# Patient Record
Sex: FEMALE | Race: WHITE | NOT HISPANIC OR LATINO | Employment: PART TIME | URBAN - METROPOLITAN AREA
[De-identification: names, ages, dates, MRNs, and addresses within clinical notes are randomized per-mention and may not be internally consistent; named-entity substitution may affect disease eponyms.]

---

## 2017-05-02 ENCOUNTER — ALLSCRIPTS OFFICE VISIT (OUTPATIENT)
Dept: OTHER | Facility: OTHER | Age: 52
End: 2017-05-02

## 2017-05-02 DIAGNOSIS — Z13.29 ENCOUNTER FOR SCREENING FOR OTHER SUSPECTED ENDOCRINE DISORDER: ICD-10-CM

## 2017-05-02 DIAGNOSIS — L25.9 CONTACT DERMATITIS: ICD-10-CM

## 2017-05-02 DIAGNOSIS — Z12.39 ENCOUNTER FOR OTHER SCREENING FOR MALIGNANT NEOPLASM OF BREAST: ICD-10-CM

## 2017-05-02 DIAGNOSIS — Z13.1 ENCOUNTER FOR SCREENING FOR DIABETES MELLITUS: ICD-10-CM

## 2017-05-02 DIAGNOSIS — Z13.220 ENCOUNTER FOR SCREENING FOR LIPOID DISORDERS: ICD-10-CM

## 2017-05-02 DIAGNOSIS — Z13.6 ENCOUNTER FOR SCREENING FOR CARDIOVASCULAR DISORDERS: ICD-10-CM

## 2017-05-16 ENCOUNTER — ALLSCRIPTS OFFICE VISIT (OUTPATIENT)
Dept: OTHER | Facility: OTHER | Age: 52
End: 2017-05-16

## 2017-05-16 ENCOUNTER — LAB REQUISITION (OUTPATIENT)
Dept: LAB | Facility: HOSPITAL | Age: 52
End: 2017-05-16
Payer: COMMERCIAL

## 2017-05-16 DIAGNOSIS — L25.9 CONTACT DERMATITIS: ICD-10-CM

## 2017-05-16 LAB
ALBUMIN SERPL BCP-MCNC: 3.7 G/DL (ref 3.5–5)
ALP SERPL-CCNC: 93 U/L (ref 46–116)
ALT SERPL W P-5'-P-CCNC: 68 U/L (ref 12–78)
ANION GAP SERPL CALCULATED.3IONS-SCNC: 7 MMOL/L (ref 4–13)
AST SERPL W P-5'-P-CCNC: 30 U/L (ref 5–45)
BASOPHILS # BLD AUTO: 0.03 THOUSANDS/ΜL (ref 0–0.1)
BASOPHILS NFR BLD AUTO: 1 % (ref 0–1)
BILIRUB SERPL-MCNC: 0.87 MG/DL (ref 0.2–1)
BUN SERPL-MCNC: 14 MG/DL (ref 5–25)
CALCIUM SERPL-MCNC: 9.2 MG/DL (ref 8.3–10.1)
CHLORIDE SERPL-SCNC: 106 MMOL/L (ref 100–108)
CO2 SERPL-SCNC: 27 MMOL/L (ref 21–32)
CREAT SERPL-MCNC: 0.79 MG/DL (ref 0.6–1.3)
EOSINOPHIL # BLD AUTO: 0.17 THOUSAND/ΜL (ref 0–0.61)
EOSINOPHIL NFR BLD AUTO: 3 % (ref 0–6)
ERYTHROCYTE [DISTWIDTH] IN BLOOD BY AUTOMATED COUNT: 14.1 % (ref 11.6–15.1)
GFR SERPL CREATININE-BSD FRML MDRD: >60 ML/MIN/1.73SQ M
GLUCOSE P FAST SERPL-MCNC: 136 MG/DL (ref 65–99)
HCT VFR BLD AUTO: 45.9 % (ref 34.8–46.1)
HGB BLD-MCNC: 15.9 G/DL (ref 11.5–15.4)
LYMPHOCYTES # BLD AUTO: 2.28 THOUSANDS/ΜL (ref 0.6–4.47)
LYMPHOCYTES NFR BLD AUTO: 37 % (ref 14–44)
MCH RBC QN AUTO: 30.1 PG (ref 26.8–34.3)
MCHC RBC AUTO-ENTMCNC: 34.6 G/DL (ref 31.4–37.4)
MCV RBC AUTO: 87 FL (ref 82–98)
MONOCYTES # BLD AUTO: 0.5 THOUSAND/ΜL (ref 0.17–1.22)
MONOCYTES NFR BLD AUTO: 8 % (ref 4–12)
NEUTROPHILS # BLD AUTO: 3.22 THOUSANDS/ΜL (ref 1.85–7.62)
NEUTS SEG NFR BLD AUTO: 51 % (ref 43–75)
NRBC BLD AUTO-RTO: 0 /100 WBCS
PLATELET # BLD AUTO: 180 THOUSANDS/UL (ref 149–390)
PMV BLD AUTO: 11.7 FL (ref 8.9–12.7)
POTASSIUM SERPL-SCNC: 4.6 MMOL/L (ref 3.5–5.3)
PROT SERPL-MCNC: 6.5 G/DL (ref 6.4–8.2)
RBC # BLD AUTO: 5.28 MILLION/UL (ref 3.81–5.12)
SODIUM SERPL-SCNC: 140 MMOL/L (ref 136–145)
WBC # BLD AUTO: 6.22 THOUSAND/UL (ref 4.31–10.16)

## 2017-05-16 PROCEDURE — 80053 COMPREHEN METABOLIC PANEL: CPT | Performed by: FAMILY MEDICINE

## 2017-05-16 PROCEDURE — 85025 COMPLETE CBC W/AUTO DIFF WBC: CPT | Performed by: FAMILY MEDICINE

## 2017-06-21 ENCOUNTER — ALLSCRIPTS OFFICE VISIT (OUTPATIENT)
Dept: OTHER | Facility: OTHER | Age: 52
End: 2017-06-21

## 2017-07-07 ENCOUNTER — GENERIC CONVERSION - ENCOUNTER (OUTPATIENT)
Dept: OTHER | Facility: OTHER | Age: 52
End: 2017-07-07

## 2017-07-07 ENCOUNTER — ALLSCRIPTS OFFICE VISIT (OUTPATIENT)
Dept: OTHER | Facility: OTHER | Age: 52
End: 2017-07-07

## 2017-09-11 ENCOUNTER — ALLSCRIPTS OFFICE VISIT (OUTPATIENT)
Dept: OTHER | Facility: OTHER | Age: 52
End: 2017-09-11

## 2017-09-11 ENCOUNTER — GENERIC CONVERSION - ENCOUNTER (OUTPATIENT)
Dept: OTHER | Facility: OTHER | Age: 52
End: 2017-09-11

## 2017-09-21 DIAGNOSIS — I83.893 VARICOSE VEINS OF BILATERAL LOWER EXTREMITIES WITH OTHER COMPLICATIONS: ICD-10-CM

## 2017-11-20 ENCOUNTER — ALLSCRIPTS OFFICE VISIT (OUTPATIENT)
Dept: OTHER | Facility: OTHER | Age: 52
End: 2017-11-20

## 2017-11-20 LAB — HBA1C MFR BLD HPLC: 5.9 %

## 2017-11-21 NOTE — PROGRESS NOTES
Assessment    1  Benign essential hypertension (401 1) (I10)   2  Elevated blood sugar (790 29) (R73 9)   3  Tinea pedis of both feet (110 4) (B35 3)   4  Cellulitis of foot (682 7) (L03 119)   5  Dermatitis (692 9) (L30 9)    Plan  Benign essential hypertension    · Lisinopril 20 MG Oral Tablet; take 1 tablet every day  Cellulitis of foot    · Amoxicillin-Pot Clavulanate 875-125 MG Oral Tablet; TAKE 1 TABLET EVERY 12HOURS WITH MEALS UNTIL GONE   · Clotrimazole 1 % External Cream; APPLY SPARINGLY TO AFFECTED AREA(S)TWICE DAILY  Cellulitis of foot, Elevated blood sugar    · Hemoglobin A1c- POC; Status:Active - Perform Order; Requested for:20Nov2017;   Dermatitis    · PredniSONE 5 MG Oral Tablet; Take five tabs x 1 d, 4 tabs x1d, 3 tabs x 1d, 2tabs x 1 d , 1 tab x 1 d  Need for influenza vaccination    · Stop: Influenza    Discussion/Summary    Patient is a 59-year-old female here for follow-up of her hypertension  Her blood pressure is still uncontrolled at 160/104  I am increasing her lisinopril  She also has a red warm and cracked skin both feet  She has mycosis of her toenails  With scaling and cracking on both feet  I am concerned that because of the erythema and warmth of her feet that with the tinea pedis, she has developed cracking in the skin and has now a bacterial infection on top of the fungus  I am prescribing an antibiotic by mouth, an antifungal cream has had elevated blood sugar in the past and so we did a fingerstick hemoglobin A1c today in the office  It is 5 9  I discussed that she needs to watch her carbohydrate intake better  also prescribed a taper of prednisone for the rash around her neck  I am not sure if this is related to the fungus as she has been scratching her feet quite a bit  I did instruct patient that with all this medication would not be wise for her to drink alcohol  Possible side effects of new medications were reviewed with the patient/guardian today   The treatment plan was reviewed with the patient/guardian  The patient/guardian understands and agrees with the treatment plan      Chief Complaint  Patient presents for a BP check  She also has a rash on her feet, arms, and chest that she would like examined  Patient is here today for follow up of chronic conditions described in HPI  History of Present Illness  Patient with scaly, red rash on feet and now is on her hands  Been using peroxide, tetrie oil/coconut oil  The patient presents for follow-up of essential hypertension  The patient states she has been doing poorly with her blood pressure control since the last visit  She has no significant interval events  Symptoms: The patient is currently asymptomatic  Home monitoring: The patient checks her blood pressure regularly  Blood pressure control has been poor  Medications: the patient is not adherent with her medication regimen  Review of Systems   Constitutional: No fever, no chills, feels well, no tiredness, no recent weight gain or weight loss  Cardiovascular: No complaints of slow heart rate, no fast heart rate, no chest pain, no palpitations, no leg claudication, no lower extremity edema  Integumentary: a rash-- and-- skin wound, but-- as noted in HPI  Active Problems  1  Benign essential hypertension (401 1) (I10)   2  Elevated blood pressure reading (796 2) (R03 0)   3  Elevated blood sugar (790 29) (R73 9)   4  History of tobacco use (V15 82) (Z87 891)   5  Myalgia (729 1) (M79 1)   6  Varicose veins of lower extremity with other complication (206 3) (A48 754)    Past Medical History  1  History of Encounter for annual routine gynecological examination (V72 31) (Z01 419)   2  History of acute sinusitis (V12 69) (Z87 09)   3  History of contact dermatitis (V13 3) (Z87 2)   4  History of urinary frequency (V13 09) (Z87 898)   5  History of Knee pain, bilateral (719 46) (M25 561,M25 562)   6  History of Screen for colon cancer (V76 51) (Z12 11)   7  History of Symptomatic varicose veins of both lower extremities (454 8) (D78 367)    The active problems and past medical history were reviewed and updated today  Surgical History  1  History of Spinal Diskectomy Lumbar    Family History  Mother    1  No pertinent family history  Father    2  Family history of hypertension (V17 49) (Z82 49)    Social History     · Former smoker (O20 62) (Y65 333)   · History of tobacco use (V15 82) (Z82 961)  The social history was reviewed and updated today  The social history was reviewed and is unchanged  Current Meds   1  Lisinopril 5 MG Oral Tablet; Take 1 tablet daily; Therapy: 79HZL7167 to (Evaluate:19Nov2017)  Requested for: 20Sep2017; Last Rx:20Sep2017 Ordered    The medication list was reviewed and updated today  Allergies  1  No Known Drug Allergies    Vitals  Vital Signs    Recorded: 20Nov2017 08:09AM   Temperature 95 8 F, Tympanic   Heart Rate 80   Pulse Quality Normal   Systolic 175, RUE, Standing   Diastolic 760, RUE, Standing   BP CUFF SIZE Large   Height 5 ft 4 in   Weight 229 lb 2 oz   BMI Calculated 39 33   BSA Calculated 2 07   O2 Saturation 97, RA       Physical Exam   Constitutional  General appearance: No acute distress, well appearing and well nourished  Pulmonary  Respiratory effort: No increased work of breathing or signs of respiratory distress  Auscultation of lungs: Clear to auscultation  Cardiovascular  Auscultation of heart: Normal rate and rhythm, normal S1 and S2, without murmurs  -- /104  Lymphatic  Palpation of lymph nodes in neck: No lymphadenopathy  Musculoskeletal  Gait and station: Normal    Skin  Skin and subcutaneous tissue: Abnormal  -- Cracking, onychomycosis of feet  erythema and warmth    Psychiatric  Orientation to person, place, and time: Normal    Mood and affect: Normal          Future Appointments    Date/Time Provider Specialty Site   12/26/2017 04:00 PM Geeta Lora DO Family Medicine [facility] [place] [facility]   12/21/2017 08:50 AM Tapan Troy, Nurse Schedule  [facility]   Electronically signed by : Carson Ware DO; Nov 20 2017  8:51AM EST                       (Author)

## 2017-11-30 ENCOUNTER — GENERIC CONVERSION - ENCOUNTER (OUTPATIENT)
Dept: OTHER | Facility: OTHER | Age: 52
End: 2017-11-30

## 2017-12-26 ENCOUNTER — GENERIC CONVERSION - ENCOUNTER (OUTPATIENT)
Dept: OTHER | Facility: OTHER | Age: 52
End: 2017-12-26

## 2018-01-13 VITALS
HEART RATE: 72 BPM | TEMPERATURE: 98.6 F | HEIGHT: 64 IN | BODY MASS INDEX: 39.11 KG/M2 | WEIGHT: 229.06 LBS | RESPIRATION RATE: 18 BRPM | OXYGEN SATURATION: 98 %

## 2018-01-13 VITALS — DIASTOLIC BLOOD PRESSURE: 100 MMHG | SYSTOLIC BLOOD PRESSURE: 172 MMHG

## 2018-01-14 VITALS
HEART RATE: 80 BPM | DIASTOLIC BLOOD PRESSURE: 102 MMHG | OXYGEN SATURATION: 97 % | TEMPERATURE: 95.8 F | HEIGHT: 64 IN | WEIGHT: 229.13 LBS | BODY MASS INDEX: 39.12 KG/M2 | SYSTOLIC BLOOD PRESSURE: 162 MMHG

## 2018-01-14 VITALS
DIASTOLIC BLOOD PRESSURE: 88 MMHG | TEMPERATURE: 97.3 F | SYSTOLIC BLOOD PRESSURE: 124 MMHG | OXYGEN SATURATION: 97 % | HEIGHT: 64 IN | BODY MASS INDEX: 39.13 KG/M2 | RESPIRATION RATE: 18 BRPM | HEART RATE: 78 BPM | WEIGHT: 229.19 LBS

## 2018-01-14 NOTE — PROGRESS NOTES
Assessment    1  Symptomatic varicose veins of both lower extremities (454 8) (J81 990)    Plan    1  Apply moisturizing cream or lotion several times a day ; Status:Complete;   Done:   98MIJ3772   2  Apply warm moist compresses to the affected area 3 times a day for 5 minutes ;   Status:Complete;   Done: 71FSF9019   3  Keep your leg elevated whenever you can to decrease swelling and increase circulation ;   Status:Complete;   Done: 29TUQ0247   4  Restrict the salt in your diet by avoiding highly salted foods ; Status:Complete;   Done:   30TFV5575   5  Gradient compression stocking, below knee, 20-30mm Hg, each; Status:Complete;     Done: 29OXE9652   6  VAS REFLUX LOWER LIMB   ; Status:Active; Requested XVP:10UJU3809;    7  Follow-up visit in 3 months Evaluation and Treatment  Follow-up  Status: Complete    Done: 13Rby9063    Discussion/Summary  Discussion Summary:   68-year-old female with bilateral lower extremity symptomatic varicose veins, R>L  patient has not utilized any conservative measures up to this point  Recommend 3 month trial of compression stockings, lower extremity elevation, low-sodium diet, aerobic activity and skin moisturization  Return to office in 3 months with MEHRDAD to reassess and determine candidacy for EVLT  Patient instructed to contact the office in the interim with any questions, concerns or change in her symptoms  Counseling Documentation With Imm: The patient was counseled regarding instructions for management, risk factor reductions, prognosis, patient and family education, impressions, risks and benefits of treatment options, importance of compliance with treatment  total time of encounter was 25 minutes and 20 minutes was spent counseling  Chief Complaint  Chief Complaint Free Text Note Form: " I am here to get my legs checked "    Ms Maya Wynn is here to get her legs checked for Varicose Veins in her right leg  Pt c/o bulging veins as well as pain   She states she gets swelling in her legs  She does exercise and walk as much as possible  Pt states they started years ago after pregnancy  History of Present Illness  Varicose Veins Vanessa Astudillo [facility]: The patient is being seen for an initial evaluation of varicose veins  Symptoms:  right bulging veins, bilateral dilated veins, bilateral discolored veins, bilateral leg swelling, itching bilaterally and bilateral leg pain, but no muscle cramps, no spider veins, no stasis dermatitis, no cellulitis, no hyperpigmentation, no venous ulcers, no dry skin and no bleeding  The patient describes the pain as aching, itching, burning, throbbing, heavy and dull  These symptoms developed 21 year(s) ago  This patient has no history of DVT, pulmonary embolism, superficial venous thrombosis, or a hypercoagulable state  Evaluation and Treatment History: This patient has had no prior surgical treatment of the venous system  This patient is not currently utilizing any conservative management strategies to manage the current symptoms  The patient has never used compression hose  This patient is not exercising regularly  This patient is not attempting to lose weight  Moisturizers are not being used  This patient is not using periodic leg elevation  Imaging: Prior venous imaging with a lower extremity venous duplex to assess for reflux has not been performed  Free Text HPI: 51-year-old female with a history of bilateral lower extremity symptomatic varicose veins presents for initial evaluation  Varicose veins are worse in the right lower extremity with a large varicosity running the length of her medial anterior thigh and shin  Patient complains of heavy sensation in the lower extremities particularly after standing all day  She complains of pruritus, burning and dull sensation from the lower extremities  Patient denies recurrent cellulitis, phlebitis, DVT, superficial thrombophlebitis, bleeding varicosities or venous ulcerations   She denies history of DVT, PE or hypercoagulable state  She denies family history of same  She has not utilize compression stockings or any other conservative measures up to this point  Her varicosities have been present for over 20 years since her last pregnancy but have become more pronounced over the last 5 years  She's had no previous intervention  Review of Systems  Complete Female - Vasc:   Constitutional: no loss in appetite and recent 20 lb weight gain, but no fever, no chills, not feeling tired and no recent weight loss  Eyes: No sudden vision loss, no blurred vision, no double vision  ENT: no loss of hearing, no nosebleeds, no hoarseness  Cardiovascular: irregular heart rate, no painful veins and no bleeding veins, but no chest pain, no intermittent leg claudication, no palpitations and leg pain with walking  Respiratory: no shortness of breath, no wheezing, no cough, no orthopnea and no complaints of PND, but shortness of breath during exertion  Gastrointestinal: No nausea, No vomiting, no diarrhea, no blood in stool  Genitourinary: no hematuria, no vaginal discharge and no unexplained vaginal bleeding, but no dysuria, no urinary hesitancy, no genital lesions, no incontinence, no nocturia and dysmenorrhea  Musculoskeletal: no limb pain, no limb swelling  Integumentary: no rash, no lesions, no wounds, no ulcer  Neurological: no dementia, no headache, no numbness, no limb weakness, no dizziness, no difficulty walking  Psychiatric: no depression, no mood disorders, no anxiety  Hematologic/Lymphatic: no bleeding disorder, no easy bruising  ROS Reviewed:   ROS reviewed  Active Problems    1  Contact dermatitis (692 9) (L25 9)   2  History of tobacco use (V15 82) (Z87 891)   3  Knee pain, bilateral (719 46) (M25 561,M25 562)   4  Myalgia (729 1) (M79 1)   5   Varicose veins of lower extremity with other complication (192 8) (A06 745)    Past Medical History  Active Problems And Past Medical History Reviewed: The active problems and past medical history were reviewed and updated today  Surgical History  Surgical History Reviewed: The surgical history was reviewed and updated today  Family History  Mother    1  No pertinent family history  Father    2  No pertinent family history  Family History Reviewed: The family history was reviewed and updated today  Social History    · Former smoker (W34 83) (T31 179)   · History of tobacco use (V15 82) (Z41 970)  Social History Reviewed: The social history was reviewed and updated today  Current Meds   1  Triamcinolone Acetonide 0 5 % External Cream; APPLY SPARINGLY AND MASSAGE IN   TWICE DAILY; Therapy: 91VNZ4133 to (Last QV:03GIB5528)  Requested for: 79QBF2211 Ordered  Medication List Reviewed: The medication list was reviewed and updated today  Allergies    1  No Known Drug Allergies    Vitals  Vital Signs    Recorded: 21Jun2017 02:22PM   Heart Rate 82   Respiration 18   Systolic 549, RUE, Sitting   Diastolic 80, RUE, Sitting   Height 5 ft 4 in   Weight 230 lb 6 oz   BMI Calculated 39 54   BSA Calculated 2 08     Results/Data  Diagnostic Studies Reviewed Vasc:   Vascular Studies Reviewed: No vascular studies for review  Physical Exam    Carotid: right 2+, no bruit heard on the right, left 2+ and no bruit on the left  Radial: right 2+ and left 2+  Posterior tibialis: right 2+ and left 2+  Dorsalis pedis: right 2+ and left 2+  Distal Pulse Exam: Normal Capillary Refill  Extremities: bilateral ankle 1+ pitting edema, bilateral pretibial 1+ pitting edema and bilateral foot 1+ pitting edema  LE Varicose Veins: right leg truncal veins, right leg reticular veins and left leg reticular veins  Large right lower extremity medial anterior thigh truncal vein extending over the anterior aspect of the knee into the anterior shin  Multiple smaller reticular varicosities bilateral calves and ankles   The heart rate was normal  The rhythm was regular  Heart sounds: normal S1, normal S2, no gallop heard, no S3 and no S4  No pericardial rub  Murmurs: No murmurs were heard  Pulmonary   Respiratory effort: No increased work of breathing or signs of respiratory distress  Auscultation of lungs: Clear to auscultation  No wheezing, no rales, no rhonchi  Abdomen   Abdomen: Abdomen soft, non-tender, no masses, non distended, no rebound tenderness  Normoactive bowel sounds  No palpable aneurysm  No bruit heard  Psychiatric   Orientation to person, place and time: Normal    Mood and affect: Normal    Eyes   Pupils and irises: Equal, round and reactive to light  Ears, Nose, Mouth, and Throat   Hearing: Normal    Neck   Neck: No jugular venous distention, normal ROM and extension  No cervical adenopathy, trachea midline, neck supple  Thyroid: Normal, no thyromegaly  Neurologic Sensory exam normal   Motor skills intact  Musculoskeletal   Gait and station: Normal    Digits and nails: Normal without clubbing or cyanosis  Range of motion: Normal    Muscle strength/tone: Normal    Skin   Skin and subcutaneous tissue: Please refer to varicosities exam above  No active venous ulcerations, wounds, cellulitis, hyperpigmentation of the lower extremities  Palpation of skin and subcutaneous tissue: Normal turgor        Future Appointments    Date/Time Provider Specialty Site   09/27/2017 01:00 PM Polo Porter DO Vascular Surgery [facility] [address] [place] [address]     Signatures   Electronically signed by : Alize Cristina, [facility]; Jun 21 2017  2:47PM EST                       (Author)    Electronically signed by : Darwin Holt DO; Jun 21 2017  3:20PM EST                       (Author)

## 2018-01-16 NOTE — RESULT NOTES
Verified Results  (1) COMPREHENSIVE METABOLIC PANEL [id number] 81:56FX Flakita Tomas     Test Name Result Flag Reference   SODIUM 140 mmol/L  136-145   POTASSIUM 4 6 mmol/L  3 5-5 3   CHLORIDE 106 mmol/L  100-108   CARBON DIOXIDE 27 mmol/L  21-32   ANION GAP (CALC) 7 mmol/L  4-13   BLOOD UREA NITROGEN 14 mg/dL  5-25   CREATININE 0 79 mg/dL  0 60-1 30   Standardized to IDMS reference method   CALCIUM 9 2 mg/dL  8 3-10 1   BILI, TOTAL 0 87 mg/dL  0 20-1 00   ALK PHOSPHATAS 93 U/L     ALT (SGPT) 68 U/L  12-78   AST(SGOT) 30 U/L  5-45   ALBUMIN 3 7 g/dL  3 5-5 0   TOTAL PROTEIN 6 5 g/dL  6 4-8 2   eGFR Non-African American      >60 0 ml/min/1 73sq m   [facility] Disease Education Program recommendations are as follows:  GFR calculation is accurate only with a steady state creatinine  Chronic Kidney disease less than 60 ml/min/1 73 sq  meters  Kidney failure less than 15 ml/min/1 73 sq  meters  GLUCOSE FASTING 136 mg/dL H 65-99     (1) CBC/PLT/DIFF [id number] 03:18PM Flakita Tomas     Test Name Result Flag Reference   WBC COUNT 6 22 Thousand/uL  4 31-10 16   RBC COUNT 5 28 Million/uL H 3 81-5 12   HEMOGLOBIN 15 9 g/dL H 11 5-15 4   HEMATOCRIT 45 9 %  34 8-46  1   MCV 87 fL  82-98   MCH 30 1 pg  26 8-34 3   MCHC 34 6 g/dL  31 4-37 4   RDW 14 1 %  11 6-15 1   MPV 11 7 fL  8 9-12 7   PLATELET COUNT 933 Thousands/uL  149-390   nRBC AUTOMATED 0 /100 WBCs     NEUTROPHILS RELATIVE PERCENT 51 %  43-75   LYMPHOCYTES RELATIVE PERCENT 37 %  14-44   MONOCYTES RELATIVE PERCENT 8 %  4-12   EOSINOPHILS RELATIVE PERCENT 3 %  0-6   BASOPHILS RELATIVE PERCENT 1 %  0-1   NEUTROPHILS ABSOLUTE COUNT 3 22 Thousands/? ??L  1 85-7 62   LYMPHOCYTES ABSOLUTE COUNT 2 28 Thousands/? ??L  0 60-4 47   MONOCYTES ABSOLUTE COUNT 0 50 Thousand/? ??L  0 17-1 22   EOSINOPHILS ABSOLUTE COUNT 0 17 Thousand/? ??L  0 00-0 61   BASOPHILS ABSOLUTE COUNT 0 03 Thousands/? ??L  0 00-0 10   This bloodwork is non-fasting   Please drink two glasses of water morning of  bloodwork  Plan  Contact dermatitis    · Routine Venipuncture - POC; Status:Complete;   Done: 00QGM7032 09:19AM  Contact dermatitis, PMH: Encounter for other screening for malignant neoplasm of  breast, PMH: Screening for cholesterol level, PMH: Screening for diabetes mellitus (DM),  PMH: Screening for hypertension, PMH: Screening for thyroid disorder    · (1) CBC/PLT/DIFF; Status: In Progress - Specimen/Data Collected;   Done: 96EKI0596   · (1) COMPREHENSIVE METABOLIC PANEL; Status: In Progress - Specimen/Data  Collected;   Done: 29XBY2830   · (1) LIPID PANEL FASTING W DIRECT LDL REFLEX; Status: In Progress -  Specimen/Data Collected;   Done: 28AFC9442   · (1) TSH WITH FT4 REFLEX; Status:Active;  Requested for:31Vbk4053;

## 2018-01-22 VITALS
OXYGEN SATURATION: 98 % | TEMPERATURE: 98.7 F | SYSTOLIC BLOOD PRESSURE: 164 MMHG | RESPIRATION RATE: 17 BRPM | DIASTOLIC BLOOD PRESSURE: 112 MMHG | BODY MASS INDEX: 39.32 KG/M2 | HEIGHT: 64 IN | WEIGHT: 230.31 LBS | HEART RATE: 73 BPM

## 2018-01-22 VITALS
OXYGEN SATURATION: 98 % | TEMPERATURE: 97.9 F | WEIGHT: 231 LBS | BODY MASS INDEX: 39.44 KG/M2 | DIASTOLIC BLOOD PRESSURE: 94 MMHG | HEIGHT: 64 IN | SYSTOLIC BLOOD PRESSURE: 130 MMHG | HEART RATE: 70 BPM

## 2018-01-22 VITALS
WEIGHT: 230.38 LBS | BODY MASS INDEX: 39.33 KG/M2 | DIASTOLIC BLOOD PRESSURE: 80 MMHG | RESPIRATION RATE: 18 BRPM | HEART RATE: 82 BPM | HEIGHT: 64 IN | SYSTOLIC BLOOD PRESSURE: 130 MMHG

## 2018-01-24 VITALS
HEIGHT: 63 IN | DIASTOLIC BLOOD PRESSURE: 84 MMHG | SYSTOLIC BLOOD PRESSURE: 148 MMHG | WEIGHT: 231.38 LBS | OXYGEN SATURATION: 98 % | RESPIRATION RATE: 18 BRPM | BODY MASS INDEX: 41 KG/M2 | HEART RATE: 77 BPM | TEMPERATURE: 98 F

## 2018-01-24 VITALS — SYSTOLIC BLOOD PRESSURE: 150 MMHG | DIASTOLIC BLOOD PRESSURE: 94 MMHG

## 2018-01-27 ENCOUNTER — OFFICE VISIT (OUTPATIENT)
Dept: FAMILY MEDICINE CLINIC | Facility: CLINIC | Age: 53
End: 2018-01-27
Payer: COMMERCIAL

## 2018-01-27 VITALS
DIASTOLIC BLOOD PRESSURE: 90 MMHG | WEIGHT: 224.3 LBS | RESPIRATION RATE: 18 BRPM | HEART RATE: 87 BPM | BODY MASS INDEX: 39.74 KG/M2 | SYSTOLIC BLOOD PRESSURE: 120 MMHG | HEIGHT: 63 IN | OXYGEN SATURATION: 98 % | TEMPERATURE: 98.5 F

## 2018-01-27 DIAGNOSIS — B96.89 ACUTE BACTERIAL SINUSITIS: ICD-10-CM

## 2018-01-27 DIAGNOSIS — J01.90 ACUTE BACTERIAL SINUSITIS: ICD-10-CM

## 2018-01-27 DIAGNOSIS — J40 BRONCHITIS: Primary | ICD-10-CM

## 2018-01-27 PROBLEM — R05.9 COUGH: Status: ACTIVE | Noted: 2018-01-27

## 2018-01-27 PROCEDURE — 99213 OFFICE O/P EST LOW 20 MIN: CPT | Performed by: FAMILY MEDICINE

## 2018-01-27 RX ORDER — LISINOPRIL 20 MG/1
TABLET ORAL
COMMUNITY
Start: 2017-12-26 | End: 2018-12-31 | Stop reason: SDUPTHER

## 2018-01-27 RX ORDER — PROMETHAZINE HYDROCHLORIDE AND CODEINE PHOSPHATE 6.25; 1 MG/5ML; MG/5ML
5 SYRUP ORAL
Qty: 120 ML | Refills: 0 | Status: SHIPPED | OUTPATIENT
Start: 2018-01-27 | End: 2018-02-10

## 2018-01-27 RX ORDER — BENZONATATE 200 MG/1
200 CAPSULE ORAL 3 TIMES DAILY PRN
Qty: 20 CAPSULE | Refills: 0 | Status: SHIPPED | OUTPATIENT
Start: 2018-01-27 | End: 2018-02-06

## 2018-01-27 RX ORDER — CEFUROXIME AXETIL 500 MG/1
500 TABLET ORAL EVERY 12 HOURS SCHEDULED
Qty: 20 TABLET | Refills: 0 | Status: SHIPPED | OUTPATIENT
Start: 2018-01-27 | End: 2018-02-16

## 2018-01-27 NOTE — PATIENT INSTRUCTIONS
Take antibiotic for full 10 days  Use the cough medicine at night time - no driving  Benzonotate - take during day up to three times a day - swallow

## 2018-01-27 NOTE — PROGRESS NOTES
Assessment/Plan:    Patient is a 46year old female with sinusitis and bronchitis  I prescribed a ten day course of antibiotics, Tessalon Perles for cough as needed and Promethazine with codeine for bedtime if needed  No problem-specific Assessment & Plan notes found for this encounter  Diagnoses and all orders for this visit:    Bronchitis  -     cefuroxime (CEFTIN) 500 mg tablet; Take 1 tablet (500 mg total) by mouth every 12 (twelve) hours for 20 days  -     benzonatate (TESSALON) 200 MG capsule; Take 1 capsule (200 mg total) by mouth 3 (three) times a day as needed for cough for up to 10 days  -     promethazine-codeine (PHENERGAN WITH CODEINE) 6 25-10 mg/5 mL syrup; Take 5 mL by mouth daily at bedtime as needed for cough for up to 14 days    Acute bacterial sinusitis  -     cefuroxime (CEFTIN) 500 mg tablet; Take 1 tablet (500 mg total) by mouth every 12 (twelve) hours for 20 days  -     benzonatate (TESSALON) 200 MG capsule; Take 1 capsule (200 mg total) by mouth 3 (three) times a day as needed for cough for up to 10 days  -     promethazine-codeine (PHENERGAN WITH CODEINE) 6 25-10 mg/5 mL syrup; Take 5 mL by mouth daily at bedtime as needed for cough for up to 14 days    Other orders  -     lisinopril (ZESTRIL) 20 mg tablet;           Subjective:      Patient ID: Angel Katz is a 46 y o  female  Patient with cough, fever, chills, aches and shooting pain in eyes  Coughing for greater than a week  Is hearing wheezing  The following portions of the patient's history were reviewed and updated as appropriate: allergies, current medications, past family history, past medical history, past social history, past surgical history and problem list     Review of Systems   Constitutional: Positive for chills, fatigue and fever  HENT: Positive for congestion  Negative for ear pain and sore throat  Respiratory: Positive for cough  Cardiovascular: Negative  Gastrointestinal: Negative  Musculoskeletal: Positive for arthralgias  Objective:     Physical Exam   Constitutional: She appears well-developed and well-nourished  HENT:   Right Ear: Tympanic membrane normal    Left Ear: Tympanic membrane normal    Nose: Mucosal edema present  Right sinus exhibits maxillary sinus tenderness and frontal sinus tenderness  Left sinus exhibits maxillary sinus tenderness and frontal sinus tenderness  Mouth/Throat: Oropharynx is clear and moist    Neck: Normal range of motion  Pulmonary/Chest:   Coarse upper airway breath sounds

## 2018-10-26 ENCOUNTER — OFFICE VISIT (OUTPATIENT)
Dept: FAMILY MEDICINE CLINIC | Facility: CLINIC | Age: 53
End: 2018-10-26
Payer: COMMERCIAL

## 2018-10-26 VITALS
BODY MASS INDEX: 38.98 KG/M2 | RESPIRATION RATE: 16 BRPM | OXYGEN SATURATION: 97 % | TEMPERATURE: 99.1 F | SYSTOLIC BLOOD PRESSURE: 140 MMHG | DIASTOLIC BLOOD PRESSURE: 90 MMHG | HEIGHT: 63 IN | WEIGHT: 220 LBS | HEART RATE: 87 BPM

## 2018-10-26 DIAGNOSIS — R73.9 ELEVATED BLOOD SUGAR: ICD-10-CM

## 2018-10-26 DIAGNOSIS — IMO0002 LUMP: ICD-10-CM

## 2018-10-26 DIAGNOSIS — Z12.39 SCREENING FOR BREAST CANCER: ICD-10-CM

## 2018-10-26 DIAGNOSIS — I10 BENIGN ESSENTIAL HYPERTENSION: Primary | ICD-10-CM

## 2018-10-26 PROCEDURE — 99214 OFFICE O/P EST MOD 30 MIN: CPT | Performed by: FAMILY MEDICINE

## 2018-10-26 PROCEDURE — 1036F TOBACCO NON-USER: CPT | Performed by: FAMILY MEDICINE

## 2018-10-26 PROCEDURE — 3008F BODY MASS INDEX DOCD: CPT | Performed by: FAMILY MEDICINE

## 2018-10-26 NOTE — PROGRESS NOTES
Assessment/Plan:    Patient is a 35-year-old female seen with hypertension and elevated blood sugar  She has not been in the office for some time and I have ordered fasting blood work  Her blood  Pressure is elevated  She has not taken her medication a couple days that she has been out of it  Patient has concern regarding lump in right supraclavicular area  It feels all like soft tissue  I believe it may be a lipoma  I am checking thyroid blood work with her fasting blood work orders  But we may need an ultrasound or CT scan to see what this lump truly is  Patient is concerned about family history of heart disease  Did discuss Ca scoring for coronary artery disease  She will think about this test   I did explain that her insurance would not cover it but [facility] does do it at a reduced fee  In the computer it is listed that 99 dollars  She has not smoked for almost 2 years  No problem-specific Assessment & Plan notes found for this encounter  Diagnoses and all orders for this visit:    Benign essential hypertension  -     Comprehensive metabolic panel; Future  -     CBC and differential; Future  -     Lipid Panel with Direct LDL reflex; Future  -     TSH, 3rd generation with Free T4 reflex; Future    Screening for breast cancer  -     Mammo screening bilateral w 3d & cad; Future    Elevated blood sugar  -     Comprehensive metabolic panel; Future  -     CBC and differential; Future  -     Lipid Panel with Direct LDL reflex; Future  -     TSH, 3rd generation with Free T4 reflex; Future  -     HEMOGLOBIN A1C W/ EAG ESTIMATION; Future    Lump          Subjective:   Chief Complaint   Patient presents with    Follow-up     neck swelling /Pt wants script for BW        Patient ID: Marcus Salcido is a 48 y o  female  Patient is here with concern about swelling on the right side of her neck  Also has some mid-upper back pain  She would like blood work done    She has been out of her BP meds for a couple days  The following portions of the patient's history were reviewed and updated as appropriate: allergies, current medications, past family history, past medical history, past social history, past surgical history and problem list     Review of Systems   Constitutional: Positive for activity change (Has increased exercise  )  HENT: Negative for congestion and sore throat  Swelling in neck about a month ago  Respiratory: Negative for cough  Musculoskeletal: Positive for back pain  Neurological: Negative for dizziness and numbness  Psychiatric/Behavioral: Negative  Objective:      /90 (BP Location: Left arm, Patient Position: Sitting, Cuff Size: Adult)   Pulse 87   Temp 99 1 °F (37 3 °C) (Tympanic)   Resp 16   Ht 5' 2 99" (1 6 m)   Wt 99 8 kg (220 lb)   LMP 08/26/2018   SpO2 97%   BMI 38 98 kg/m²          Physical Exam   Constitutional: She is oriented to person, place, and time  She appears well-developed  No distress  Neck: Carotid bruit is not present  No thyromegaly present  Cardiovascular: Normal rate, regular rhythm and normal heart sounds  /94   Pulmonary/Chest: Effort normal and breath sounds normal    Musculoskeletal: She exhibits no edema  Lymphadenopathy:     She has no cervical adenopathy  Neurological: She is alert and oriented to person, place, and time  Skin: Skin is warm and dry  Soft tissue mass in right supra clavicular area  Psychiatric: She has a normal mood and affect  Nursing note and vitals reviewed

## 2018-11-14 ENCOUNTER — CLINICAL SUPPORT (OUTPATIENT)
Dept: FAMILY MEDICINE CLINIC | Facility: CLINIC | Age: 53
End: 2018-11-14
Payer: COMMERCIAL

## 2018-11-14 DIAGNOSIS — R73.9 ELEVATED BLOOD SUGAR: ICD-10-CM

## 2018-11-14 DIAGNOSIS — Z13.1 ENCOUNTER FOR SCREENING FOR DIABETES MELLITUS: ICD-10-CM

## 2018-11-14 DIAGNOSIS — I10 BENIGN ESSENTIAL HYPERTENSION: ICD-10-CM

## 2018-11-14 DIAGNOSIS — Z13.6 ENCOUNTER FOR SCREENING FOR CARDIOVASCULAR DISORDERS: ICD-10-CM

## 2018-11-14 DIAGNOSIS — Z13.29 ENCOUNTER FOR SCREENING FOR OTHER SUSPECTED ENDOCRINE DISORDER: ICD-10-CM

## 2018-11-14 DIAGNOSIS — Z12.39 ENCOUNTER FOR OTHER SCREENING FOR MALIGNANT NEOPLASM OF BREAST: ICD-10-CM

## 2018-11-14 DIAGNOSIS — Z13.220 ENCOUNTER FOR SCREENING FOR LIPOID DISORDERS: ICD-10-CM

## 2018-11-14 DIAGNOSIS — L25.9 CONTACT DERMATITIS: ICD-10-CM

## 2018-11-14 LAB
ALBUMIN SERPL BCP-MCNC: 3.8 G/DL (ref 3.5–5)
ALP SERPL-CCNC: 113 U/L (ref 46–116)
ALT SERPL W P-5'-P-CCNC: 69 U/L (ref 12–78)
ANION GAP SERPL CALCULATED.3IONS-SCNC: 8 MMOL/L (ref 4–13)
AST SERPL W P-5'-P-CCNC: 43 U/L (ref 5–45)
BASOPHILS # BLD AUTO: 0.07 THOUSANDS/ΜL (ref 0–0.1)
BASOPHILS NFR BLD AUTO: 1 % (ref 0–1)
BILIRUB SERPL-MCNC: 1.53 MG/DL (ref 0.2–1)
BUN SERPL-MCNC: 19 MG/DL (ref 5–25)
CALCIUM SERPL-MCNC: 8.8 MG/DL (ref 8.3–10.1)
CHLORIDE SERPL-SCNC: 107 MMOL/L (ref 100–108)
CHOLEST SERPL-MCNC: 278 MG/DL (ref 50–200)
CO2 SERPL-SCNC: 23 MMOL/L (ref 21–32)
CREAT SERPL-MCNC: 0.92 MG/DL (ref 0.6–1.3)
EOSINOPHIL # BLD AUTO: 0.18 THOUSAND/ΜL (ref 0–0.61)
EOSINOPHIL NFR BLD AUTO: 2 % (ref 0–6)
ERYTHROCYTE [DISTWIDTH] IN BLOOD BY AUTOMATED COUNT: 13.4 % (ref 11.6–15.1)
EST. AVERAGE GLUCOSE BLD GHB EST-MCNC: 137 MG/DL
GFR SERPL CREATININE-BSD FRML MDRD: 71 ML/MIN/1.73SQ M
GLUCOSE P FAST SERPL-MCNC: 145 MG/DL (ref 65–99)
HBA1C MFR BLD: 6.4 % (ref 4.2–6.3)
HCT VFR BLD AUTO: 47.5 % (ref 34.8–46.1)
HDLC SERPL-MCNC: 38 MG/DL (ref 40–60)
HGB BLD-MCNC: 16.1 G/DL (ref 11.5–15.4)
IMM GRANULOCYTES # BLD AUTO: 0.03 THOUSAND/UL (ref 0–0.2)
IMM GRANULOCYTES NFR BLD AUTO: 0 % (ref 0–2)
LDLC SERPL CALC-MCNC: 163 MG/DL (ref 0–100)
LYMPHOCYTES # BLD AUTO: 2.44 THOUSANDS/ΜL (ref 0.6–4.47)
LYMPHOCYTES NFR BLD AUTO: 32 % (ref 14–44)
MCH RBC QN AUTO: 30.1 PG (ref 26.8–34.3)
MCHC RBC AUTO-ENTMCNC: 33.9 G/DL (ref 31.4–37.4)
MCV RBC AUTO: 89 FL (ref 82–98)
MONOCYTES # BLD AUTO: 0.49 THOUSAND/ΜL (ref 0.17–1.22)
MONOCYTES NFR BLD AUTO: 7 % (ref 4–12)
NEUTROPHILS # BLD AUTO: 4.33 THOUSANDS/ΜL (ref 1.85–7.62)
NEUTS SEG NFR BLD AUTO: 58 % (ref 43–75)
NRBC BLD AUTO-RTO: 0 /100 WBCS
PLATELET # BLD AUTO: 184 THOUSANDS/UL (ref 149–390)
PMV BLD AUTO: 11.6 FL (ref 8.9–12.7)
POTASSIUM SERPL-SCNC: 4.2 MMOL/L (ref 3.5–5.3)
PROT SERPL-MCNC: 6.9 G/DL (ref 6.4–8.2)
RBC # BLD AUTO: 5.34 MILLION/UL (ref 3.81–5.12)
SODIUM SERPL-SCNC: 138 MMOL/L (ref 136–145)
T4 FREE SERPL-MCNC: 0.96 NG/DL (ref 0.76–1.46)
TRIGL SERPL-MCNC: 386 MG/DL
TSH SERPL DL<=0.05 MIU/L-ACNC: 6.09 UIU/ML (ref 0.36–3.74)
WBC # BLD AUTO: 7.54 THOUSAND/UL (ref 4.31–10.16)

## 2018-11-14 PROCEDURE — 84443 ASSAY THYROID STIM HORMONE: CPT | Performed by: FAMILY MEDICINE

## 2018-11-14 PROCEDURE — 36415 COLL VENOUS BLD VENIPUNCTURE: CPT | Performed by: FAMILY MEDICINE

## 2018-11-14 PROCEDURE — 83036 HEMOGLOBIN GLYCOSYLATED A1C: CPT | Performed by: FAMILY MEDICINE

## 2018-11-14 PROCEDURE — 84439 ASSAY OF FREE THYROXINE: CPT

## 2018-11-14 PROCEDURE — 80061 LIPID PANEL: CPT | Performed by: FAMILY MEDICINE

## 2018-11-14 PROCEDURE — 80053 COMPREHEN METABOLIC PANEL: CPT | Performed by: FAMILY MEDICINE

## 2018-11-14 PROCEDURE — 85025 COMPLETE CBC W/AUTO DIFF WBC: CPT | Performed by: FAMILY MEDICINE

## 2018-12-31 DIAGNOSIS — I10 ESSENTIAL HYPERTENSION: Primary | ICD-10-CM

## 2018-12-31 NOTE — TELEPHONE ENCOUNTER
Pt called req a refill     Lisinopril (ZESTRIL) 20 mg tablet  Take 1 tablet daily  Qty: 30  Refill: 3    Please send to [facility]

## 2019-01-02 RX ORDER — LISINOPRIL 20 MG/1
TABLET ORAL
Qty: 30 TABLET | Refills: 3 | Status: SHIPPED | OUTPATIENT
Start: 2019-01-02 | End: 2019-06-17 | Stop reason: SDUPTHER

## 2019-06-17 ENCOUNTER — OFFICE VISIT (OUTPATIENT)
Dept: FAMILY MEDICINE CLINIC | Facility: CLINIC | Age: 54
End: 2019-06-17
Payer: COMMERCIAL

## 2019-06-17 VITALS
BODY MASS INDEX: 39.02 KG/M2 | HEART RATE: 86 BPM | HEIGHT: 63 IN | OXYGEN SATURATION: 97 % | DIASTOLIC BLOOD PRESSURE: 98 MMHG | TEMPERATURE: 97.9 F | WEIGHT: 220.2 LBS | RESPIRATION RATE: 16 BRPM | SYSTOLIC BLOOD PRESSURE: 190 MMHG

## 2019-06-17 DIAGNOSIS — F41.9 ANXIETY: ICD-10-CM

## 2019-06-17 DIAGNOSIS — R79.89 ABNORMAL TSH: ICD-10-CM

## 2019-06-17 DIAGNOSIS — I10 ESSENTIAL HYPERTENSION: Primary | ICD-10-CM

## 2019-06-17 DIAGNOSIS — E78.2 MIXED HYPERLIPIDEMIA: ICD-10-CM

## 2019-06-17 DIAGNOSIS — R73.9 ELEVATED BLOOD SUGAR: ICD-10-CM

## 2019-06-17 PROCEDURE — 1036F TOBACCO NON-USER: CPT | Performed by: FAMILY MEDICINE

## 2019-06-17 PROCEDURE — 99214 OFFICE O/P EST MOD 30 MIN: CPT | Performed by: FAMILY MEDICINE

## 2019-06-17 PROCEDURE — 3008F BODY MASS INDEX DOCD: CPT | Performed by: FAMILY MEDICINE

## 2019-06-17 RX ORDER — LORAZEPAM 0.5 MG/1
0.5 TABLET ORAL EVERY 8 HOURS PRN
Qty: 30 TABLET | Refills: 0 | Status: SHIPPED | OUTPATIENT
Start: 2019-06-17 | End: 2020-07-07 | Stop reason: ALTCHOICE

## 2019-06-17 RX ORDER — LISINOPRIL 20 MG/1
20 TABLET ORAL DAILY
Qty: 30 TABLET | Refills: 3 | Status: SHIPPED | OUTPATIENT
Start: 2019-06-17 | End: 2020-07-07 | Stop reason: SDUPTHER

## 2020-07-07 ENCOUNTER — OFFICE VISIT (OUTPATIENT)
Dept: FAMILY MEDICINE CLINIC | Facility: CLINIC | Age: 55
End: 2020-07-07
Payer: COMMERCIAL

## 2020-07-07 ENCOUNTER — TELEPHONE (OUTPATIENT)
Dept: OTHER | Facility: OTHER | Age: 55
End: 2020-07-07

## 2020-07-07 ENCOUNTER — HOSPITAL ENCOUNTER (OUTPATIENT)
Dept: NON INVASIVE DIAGNOSTICS | Facility: HOSPITAL | Age: 55
Discharge: HOME | End: 2020-07-07
Payer: COMMERCIAL

## 2020-07-07 VITALS
HEIGHT: 64 IN | WEIGHT: 216.8 LBS | HEART RATE: 94 BPM | TEMPERATURE: 96.8 F | BODY MASS INDEX: 37.01 KG/M2 | SYSTOLIC BLOOD PRESSURE: 134 MMHG | DIASTOLIC BLOOD PRESSURE: 94 MMHG | OXYGEN SATURATION: 96 %

## 2020-07-07 DIAGNOSIS — M79.89 PAIN AND SWELLING OF LOWER LEG, LEFT: Primary | ICD-10-CM

## 2020-07-07 DIAGNOSIS — I83.813 VARICOSE VEINS OF BOTH LOWER EXTREMITIES WITH PAIN: ICD-10-CM

## 2020-07-07 DIAGNOSIS — L03.116 CELLULITIS OF LEFT LOWER EXTREMITY: ICD-10-CM

## 2020-07-07 DIAGNOSIS — Z12.11 ENCOUNTER FOR SCREENING COLONOSCOPY: ICD-10-CM

## 2020-07-07 DIAGNOSIS — M79.662 PAIN AND SWELLING OF LOWER LEG, LEFT: ICD-10-CM

## 2020-07-07 DIAGNOSIS — M79.662 PAIN AND SWELLING OF LOWER LEG, LEFT: Primary | ICD-10-CM

## 2020-07-07 DIAGNOSIS — I10 ESSENTIAL HYPERTENSION: ICD-10-CM

## 2020-07-07 DIAGNOSIS — M79.89 PAIN AND SWELLING OF LOWER LEG, LEFT: ICD-10-CM

## 2020-07-07 DIAGNOSIS — E78.2 MIXED HYPERLIPIDEMIA: ICD-10-CM

## 2020-07-07 DIAGNOSIS — Z12.39 BREAST CANCER SCREENING: ICD-10-CM

## 2020-07-07 DIAGNOSIS — R79.89 ABNORMAL TSH: ICD-10-CM

## 2020-07-07 DIAGNOSIS — R73.9 ELEVATED BLOOD SUGAR: ICD-10-CM

## 2020-07-07 PROBLEM — R05.9 COUGH: Status: RESOLVED | Noted: 2018-01-27 | Resolved: 2020-07-07

## 2020-07-07 PROCEDURE — 1036F TOBACCO NON-USER: CPT | Performed by: PHYSICIAN ASSISTANT

## 2020-07-07 PROCEDURE — 93970 EXTREMITY STUDY: CPT

## 2020-07-07 PROCEDURE — 99214 OFFICE O/P EST MOD 30 MIN: CPT | Performed by: PHYSICIAN ASSISTANT

## 2020-07-07 PROCEDURE — 3075F SYST BP GE 130 - 139MM HG: CPT | Performed by: PHYSICIAN ASSISTANT

## 2020-07-07 PROCEDURE — 3008F BODY MASS INDEX DOCD: CPT | Performed by: PHYSICIAN ASSISTANT

## 2020-07-07 PROCEDURE — 3080F DIAST BP >= 90 MM HG: CPT | Performed by: PHYSICIAN ASSISTANT

## 2020-07-07 RX ORDER — LISINOPRIL 20 MG/1
20 TABLET ORAL DAILY
Qty: 90 TABLET | Refills: 0 | Status: SHIPPED | OUTPATIENT
Start: 2020-07-07 | End: 2020-09-03 | Stop reason: SDUPTHER

## 2020-07-07 RX ORDER — CEPHALEXIN 500 MG/1
500 CAPSULE ORAL EVERY 8 HOURS SCHEDULED
Qty: 21 CAPSULE | Refills: 0 | Status: SHIPPED | OUTPATIENT
Start: 2020-07-07 | End: 2020-07-14

## 2020-07-07 NOTE — PROGRESS NOTES
Assessment/Plan:    1  Pain and swelling of lower leg, left  Will rule out blood clot with lower extremity Doppler  ER precaution   - VAS lower limb venous duplex study, unilateral/limited; Future    2  Cellulitis of left lower extremity  Symptoms likely secondary to cellulitis  Discussed skin care  Will cover with course of Keflex  Finish course completely  Recommended probiotic  Call office if symptoms worsen or fail to improve  - cephalexin (KEFLEX) 500 mg capsule; Take 1 capsule (500 mg total) by mouth every 8 (eight) hours for 7 days  Dispense: 21 capsule; Refill: 0    3  Essential hypertension  Refill lisinopril  Borderline today  Ensure compliance  Limit sodium, alcohol, and caffeine  Follow-up for chronic conditions and labs with Dr Mitul Mojica on 07/27   - lisinopril (ZESTRIL) 20 mg tablet; Take 1 tablet (20 mg total) by mouth daily  Dispense: 90 tablet; Refill: 0  - CBC and differential; Future  - Comprehensive metabolic panel; Future  - Microalbumin / creatinine urine ratio    4  Breast cancer screening  Due for breast cancer screening  Mammogram ordered  - Mammo screening bilateral w cad; Future    5  Encounter for screening colonoscopy  Overdue for colorectal cancer screening  Patient agreeable to colonoscopy  Discussed risks and benefits   - Ambulatory referral for colonoscopy; Future    6  Elevated blood sugar  Due for repeat A1c   - HEMOGLOBIN A1C W/ EAG ESTIMATION; Future    7  Mixed hyperlipidemia  Due for lipid panel   - Lipid panel; Future    8  Abnormal TSH  Due for repeat TSH   - TSH, 3rd generation with Free T4 reflex; Future    9  Varicose veins of both lower extremities with pain  Referral to vascular surgeon  - Ambulatory referral to Vascular Surgery; Future      Patient Active Problem List   Diagnosis    Benign essential hypertension    Elevated blood sugar    Mixed hyperlipidemia    Abnormal TSH        Subjective:      Patient ID: Cordell Noreen is a 47 y  o  female  Patient is here complaining of left leg pain for 4 days  She is unsure if it started as a bug bite  She does not remember how started  States her left lower leg is swollen and tender to the touch  Especially the back of her lower calf and ankle  She does admit feels warm  Denies redness  Denies numbness or tingling  Denies weakness  Does admit to ecchymosis  States she salt to spots earlier when it 1st started  No issue with her right leg  She does admit to history of varicose veins  Sometimes these do cause pain  She has been thinking about seeing a vascular surgeon  Denies difficulty walking  Denies injury or trauma  Denies dizziness  Denies chest pain or palpitations  Denies shortness of breath  Denies any other associated symptoms  No prior thrombotic events  She does have a history of hypertension  States she has been compliant with her lisinopril  The following portions of the patient's history were reviewed and updated as appropriate: allergies, current medications, past family history, past medical history, past social history, past surgical history and problem list     Review of Systems   Constitutional: Negative for chills, fatigue and fever  HENT: Negative for congestion, ear pain, postnasal drip, rhinorrhea and sore throat  Respiratory: Negative for cough, chest tightness, shortness of breath and wheezing  Cardiovascular: Positive for leg swelling  Negative for chest pain and palpitations  Gastrointestinal: Negative for abdominal pain, constipation, diarrhea, nausea and vomiting  Musculoskeletal: Positive for arthralgias, joint swelling and myalgias  Negative for back pain, gait problem, neck pain and neck stiffness  Skin: Negative for color change, pallor and rash  Neurological: Negative for dizziness, tremors, weakness, light-headedness, numbness and headaches  Hematological: Negative for adenopathy  Does not bruise/bleed easily  Objective:      /94 (BP Location: Left arm, Patient Position: Sitting, Cuff Size: Large)   Pulse 94   Temp (!) 96 8 °F (36 °C)   Ht 5' 4" (1 626 m)   Wt 98 3 kg (216 lb 12 8 oz)   LMP 08/26/2018   SpO2 96%   BMI 37 21 kg/m²          Physical Exam   Constitutional: She is oriented to person, place, and time  She appears well-developed and well-nourished  HENT:   Head: Normocephalic and atraumatic  Eyes: Pupils are equal, round, and reactive to light  Conjunctivae are normal    Neck: Normal range of motion  Neck supple  Cardiovascular: Normal rate, regular rhythm, S1 normal, S2 normal, normal heart sounds, intact distal pulses and normal pulses  Bilateral lower extremity edema, left worse than right  Nonpitting  Bilateral varicose veins of lower extremities  Pulmonary/Chest: Effort normal and breath sounds normal    Abdominal: Soft  Normal appearance and bowel sounds are normal  She exhibits no mass  There is no hepatosplenomegaly  There is no tenderness  Musculoskeletal: Normal range of motion  Negative Homans side bilaterally  Lymphadenopathy:     She has no cervical adenopathy  Neurological: She is alert and oriented to person, place, and time  Skin: Skin is warm, dry and intact  No rash noted  Posterior lower leg mildly erythematous and warm to the touch  Tender to mild palpation  Psychiatric: She has a normal mood and affect  Her behavior is normal  Judgment and thought content normal    Nursing note and vitals reviewed  Current Outpatient Medications on File Prior to Visit   Medication Sig Dispense Refill    [DISCONTINUED] lisinopril (ZESTRIL) 20 mg tablet Take 1 tablet (20 mg total) by mouth daily 30 tablet 3    [DISCONTINUED] LORazepam (ATIVAN) 0 5 mg tablet Take 1 tablet (0 5 mg total) by mouth every 8 (eight) hours as needed (anxiety related to flying) for up to 10 days 30 tablet 0     No current facility-administered medications on file prior to visit

## 2020-07-08 DIAGNOSIS — M79.89 PAIN AND SWELLING OF LOWER LEG, LEFT: Primary | ICD-10-CM

## 2020-07-08 DIAGNOSIS — M79.662 PAIN AND SWELLING OF LOWER LEG, LEFT: Primary | ICD-10-CM

## 2020-07-08 PROCEDURE — 93970 EXTREMITY STUDY: CPT | Performed by: SURGERY

## 2020-07-08 NOTE — TELEPHONE ENCOUNTER
Caller: [facility]  Phone# [phone]  Concerning results      Kathryn Triplett

## 2020-07-15 ENCOUNTER — TELEPHONE (OUTPATIENT)
Dept: FAMILY MEDICINE CLINIC | Facility: CLINIC | Age: 55
End: 2020-07-15

## 2020-07-15 NOTE — TELEPHONE ENCOUNTER
Return phone call from patient regarding recent VAS venous duplex study  She wanted to know why were repeating the study and I explained to her Celina's previous message  She stated that we never called her to give her the results, and I explained that we tried to reach her twice last week by phone  She called me a liar and said that no one called her  I tried to redirect the conversation by asking her to contact central scheduling to have the testing done, but she continued to call our office a bunch of liars  I told her I would not argue with her, told her to have a nice day, and disconnected the phone call

## 2020-07-20 ENCOUNTER — TELEPHONE (OUTPATIENT)
Dept: FAMILY MEDICINE CLINIC | Facility: CLINIC | Age: 55
End: 2020-07-20

## 2020-07-20 NOTE — TELEPHONE ENCOUNTER
Patient left a voicemail on the script line asking for a call back to discuss her previous test  States she has questions  She did not state on the voicemail which test she is referring too

## 2020-07-20 NOTE — TELEPHONE ENCOUNTER
I will be with patient's for the next couple hours  Please call patient to see what her concerns are  Patient was referred to a vascular specialist for her legs    Thank you

## 2020-07-23 PROBLEM — E66.01 MORBID OBESITY (HCC): Status: ACTIVE | Noted: 2020-07-23

## 2020-07-23 NOTE — TELEPHONE ENCOUNTER
Spoke to pt has test scheduled for tomorrow wanted to move appt so we have results before visit moved for 8/4/20

## 2020-07-24 ENCOUNTER — HOSPITAL ENCOUNTER (OUTPATIENT)
Dept: NON INVASIVE DIAGNOSTICS | Facility: HOSPITAL | Age: 55
Discharge: HOME | End: 2020-07-24
Payer: COMMERCIAL

## 2020-07-24 DIAGNOSIS — M79.89 PAIN AND SWELLING OF LOWER LEG, LEFT: ICD-10-CM

## 2020-07-24 DIAGNOSIS — M79.662 PAIN AND SWELLING OF LOWER LEG, LEFT: ICD-10-CM

## 2020-07-24 PROCEDURE — 93971 EXTREMITY STUDY: CPT

## 2020-07-25 PROCEDURE — 93971 EXTREMITY STUDY: CPT | Performed by: SURGERY

## 2020-08-13 ENCOUNTER — TELEPHONE (OUTPATIENT)
Dept: FAMILY MEDICINE CLINIC | Facility: CLINIC | Age: 55
End: 2020-08-13

## 2020-08-13 DIAGNOSIS — M25.561 ACUTE PAIN OF RIGHT KNEE: Primary | ICD-10-CM

## 2020-08-13 NOTE — TELEPHONE ENCOUNTER
Call patient - I put order in - I assumed this is acute pain, not something she has had for a long time

## 2020-08-13 NOTE — TELEPHONE ENCOUNTER
Patient is asking for a referral to Orthopedics  She would like to go to the [facility] location  Experiencing unbearable pain in her R knee  I tried to convince her to make an appt here first, but she said it's an Orthopedist she needs      [phone]

## 2020-08-17 ENCOUNTER — OFFICE VISIT (OUTPATIENT)
Dept: OBGYN CLINIC | Facility: MEDICAL CENTER | Age: 55
End: 2020-08-17
Payer: COMMERCIAL

## 2020-08-17 ENCOUNTER — APPOINTMENT (OUTPATIENT)
Dept: RADIOLOGY | Facility: MEDICAL CENTER | Age: 55
End: 2020-08-17
Payer: COMMERCIAL

## 2020-08-17 VITALS
BODY MASS INDEX: 36.88 KG/M2 | HEART RATE: 87 BPM | SYSTOLIC BLOOD PRESSURE: 166 MMHG | WEIGHT: 216 LBS | TEMPERATURE: 97.4 F | HEIGHT: 64 IN | DIASTOLIC BLOOD PRESSURE: 100 MMHG

## 2020-08-17 DIAGNOSIS — M25.561 ACUTE PAIN OF RIGHT KNEE: ICD-10-CM

## 2020-08-17 PROCEDURE — 3077F SYST BP >= 140 MM HG: CPT | Performed by: ORTHOPAEDIC SURGERY

## 2020-08-17 PROCEDURE — 1036F TOBACCO NON-USER: CPT | Performed by: ORTHOPAEDIC SURGERY

## 2020-08-17 PROCEDURE — 3008F BODY MASS INDEX DOCD: CPT | Performed by: ORTHOPAEDIC SURGERY

## 2020-08-17 PROCEDURE — 73564 X-RAY EXAM KNEE 4 OR MORE: CPT

## 2020-08-17 PROCEDURE — 99243 OFF/OP CNSLTJ NEW/EST LOW 30: CPT | Performed by: ORTHOPAEDIC SURGERY

## 2020-08-17 PROCEDURE — 3080F DIAST BP >= 90 MM HG: CPT | Performed by: ORTHOPAEDIC SURGERY

## 2020-08-17 PROCEDURE — 73562 X-RAY EXAM OF KNEE 3: CPT

## 2020-08-17 NOTE — PATIENT INSTRUCTIONS
Discussed with the patient that I believe her knee pain is most likely coming from osteoarthritis  It is also possible there could be a meniscal tear  We will try physical therapy  She has a home compression brace  She will try topical NSAIDs  Recommend Tylenol for break through pain  She will use ice and/or moist heat as needed

## 2020-08-17 NOTE — LETTER
August 17, 2020     Stacy Frankel DO  [address]  [address] [place] [address]    Patient: Jose Blanchard   YOB: 1965   Date of Visit: 8/17/2020       Dear Dr Carpio Counts:    Thank you for referring Jose Blanchard to me for evaluation  Below are my notes for this consultation  If you have questions, please do not hesitate to call me  I look forward to following your patient along with you  Sincerely,        Ernie Huffman DO        CC: No Recipients  Ernie Huffman DO  8/17/2020  3:11 PM  Signed  Ortho Sports Medicine Knee New Patient Visit     Assesment:   47 y o  female right knee medial joint mild-to-moderate osteoarthritis    Plan:    Discussed options with the patient today  Patient made an informed decision to proceed with physical therapy, topical NSAIDs, and compression (via home brace)  She will follow-up in 3 months, but may cancel if she is feeling better  I suspect there could be a small meniscal tear as well  If still having trouble, may order an MRI at follow-up  Conservative treatment:    Ice to knee for 20 minutes at least 1-2 times daily  PT for ROM/strengthening to knee, hip and core  OTC NSAIDS prn for pain  Tylenol for pain  Imaging: All imaging from today was reviewed by myself and explained to the patient  Injection:    No Injection planned at this time  Surgery:     No surgery is recommended at this point, continue with conservative treatment plan as noted  Follow up:    Return in about 3 months (around 11/17/2020) for Recheck for right knee pain secondary to OA and/or small meniscal tear  Chief Complaint   Patient presents with    Right Knee - Pain       History of Present Illness: The patient is a 47 y o  female with a history of HTN, 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, referred to me by their primary care physician, seen in clinic for evaluation of right knee pain        Pain is located anterior, medial   The patient rates the pain as a 8/10  The pain has been present for 1 weeks  The patient sustained no injuries  There is no mechanism of injury  The pain is characterized as sharp, stabbing  The pain is present intermittent  Pain is improved by rest, ice and NSAIDS  Pain is aggravated by stairs, squatting, weight bearing, walking and standing  Symptoms include clicking  The patient has tried rest, ice and NSAIDS  Knee Surgical History:  None    Past Medical, Social and Family History:  Past Medical History:   Diagnosis Date    Symptomatic varicose veins of both lower extremities     LAST ASSESSED: [id number]    Urinary frequency     LAST ASSESSED: [id number]     Past Surgical History:   Procedure Laterality Date    LUMBAR DISCECTOMY      LAST ASSESSED: [id number]     No Known Allergies  Current Outpatient Medications on File Prior to Visit   Medication Sig Dispense Refill    lisinopril (ZESTRIL) 20 mg tablet Take 1 tablet (20 mg total) by mouth daily 90 tablet 0     No current facility-administered medications on file prior to visit        Social History     Socioeconomic History    Marital status: /Civil Union     Spouse name: Not on file    Number of children: Not on file    Years of education: Not on file    Highest education level: Not on file   Occupational History    Not on file   Social Needs    Financial resource strain: Not on file    Food insecurity     Worry: Not on file     Inability: Not on file   [employer] needs     Medical: Not on file     Non-medical: Not on file   Tobacco Use    Smoking status: Former Smoker    Smokeless tobacco: Former User    Tobacco comment: TOBACCO USE   Substance and Sexual Activity    Alcohol use: Yes     Comment: rarely    Drug use: No    Sexual activity: Not on file   Lifestyle    Physical activity     Days per week: Not on file     Minutes per session: Not on file    Stress: Not on file   Relationships    Social connections     Talks on phone: Not on file     Gets together: Not on file     Attends Advent service: Not on file     Active member of club or organization: Not on file     Attends meetings of clubs or organizations: Not on file     Relationship status: Not on file    Intimate partner violence     Fear of current or ex partner: Not on file     Emotionally abused: Not on file     Physically abused: Not on file     Forced sexual activity: Not on file   Other Topics Concern    Not on file   Social History Narrative    Not on file         I have reviewed the past medical, surgical, social and family history, medications and allergies as documented in the EMR  Review of systems: ROS is negative other than that noted in the HPI  Constitutional: Negative for fatigue and fever  HENT: Negative for sore throat  Respiratory: Negative for shortness of breath  Cardiovascular: Negative for chest pain  Gastrointestinal: Negative for abdominal pain  Endocrine: Negative for cold intolerance and heat intolerance  Genitourinary: Negative for flank pain  Musculoskeletal: Negative for back pain  Skin: Negative for rash  Allergic/Immunologic: Negative for immunocompromised state  Neurological: Negative for dizziness  Psychiatric/Behavioral: Negative for agitation  Physical Exam:    Blood pressure 166/100, pulse 87, temperature (!) 97 4 °F (36 3 °C), height 5' 4" (1 626 m), weight 98 kg (216 lb), last menstrual period 08/26/2018  General/Constitutional: NAD, well developed, well nourished  HENT: Normocephalic, atraumatic  CV: Intact distal pulses, regular rate  Resp: No respiratory distress or labored breathing  Lymphatic: No lymphadenopathy palpated  Neuro: Alert and Oriented x 3, no focal deficits  Psych: Normal mood, normal affect, normal judgement, normal behavior  Skin: Warm, dry, no rashes, no erythema      Knee Exam (focused):                 RIGHT LEFT   ROM:   0-130 0-130 Palpation: Effusion negative negative     MJL tenderness Positive Negative     LJL tenderness Negative Negative   Meniscus: William Negative Negative    Apley's Compression Negative Negative   Instability: Varus stable stable     Valgus stable stable   Special Tests: Lachman Negative Negative     Posterior drawer Negative Negative     Anterior drawer Negative Negative     Pivot shift not tested not tested     Dial not tested not tested   Patella: Palpation no tenderness no tenderness     Mobility 1/4 1/4     Apprehension Negative Negative   Other: Single leg 1/4 squat not tested not tested      Tenderness with valgus stress of the left knee  LE NV Exam: +2 DP/PT pulses bilaterally  Sensation intact to light touch L2-S1 bilaterally     Bilateral hip ROM demonstrates no pain actively or passively    No calf tenderness to palpation bilaterally    Knee Imaging    X-rays of the right knee were reviewed, which demonstrate mild-to-moderate medial joint OA  Left greater than right  I have reviewed the radiology report and agree with their impression  Myranda Benedict DO  8/17/2020  2:56 PM  Attested  Ortho Sports Medicine Knee New Patient Visit     Assesment:   47 y o  female right knee medial joint mild-to-moderate osteoarthritis    Plan:    Discussed options with the patient today  Patient made an informed decision to proceed with physical therapy, topical NSAIDs, and compression (via home brace)  She will follow-up in 3 months, but may cancel if she is feeling better  I suspect there could be a small meniscal tear as well  If still having trouble, may order an MRI at follow-up  Conservative treatment:    Ice to knee for 20 minutes at least 1-2 times daily  PT for ROM/strengthening to knee, hip and core  OTC NSAIDS prn for pain  Tylenol for pain  Imaging: All imaging from today was reviewed by myself and explained to the patient         Injection:    No Injection planned at this time       Surgery:     No surgery is recommended at this point, continue with conservative treatment plan as noted  Follow up:    No follow-ups on file  Chief Complaint   Patient presents with    Right Knee - Pain       History of Present Illness: The patient is a 47 y o  female with a history of HTN, 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, referred to me by their primary care physician, seen in clinic for evaluation of right knee pain  Pain is located anterior, medial   The patient rates the pain as a 8/10  The pain has been present for 1 weeks  The patient sustained no injuries  There is no mechanism of injury  The pain is characterized as sharp, stabbing  The pain is present intermittent  Pain is improved by rest, ice and NSAIDS  Pain is aggravated by stairs, squatting, weight bearing, walking and standing  Symptoms include clicking  The patient has tried rest, ice and NSAIDS  Knee Surgical History:  None    Past Medical, Social and Family History:  Past Medical History:   Diagnosis Date    Symptomatic varicose veins of both lower extremities     LAST ASSESSED: [id number]    Urinary frequency     LAST ASSESSED: [id number]     Past Surgical History:   Procedure Laterality Date    LUMBAR DISCECTOMY      LAST ASSESSED: [id number]     No Known Allergies  Current Outpatient Medications on File Prior to Visit   Medication Sig Dispense Refill    lisinopril (ZESTRIL) 20 mg tablet Take 1 tablet (20 mg total) by mouth daily 90 tablet 0     No current facility-administered medications on file prior to visit        Social History     Socioeconomic History    Marital status: /Civil Union     Spouse name: Not on file    Number of children: Not on file    Years of education: Not on file    Highest education level: Not on file   Occupational History    Not on file   Social Needs    Financial resource strain: Not on file    Food insecurity Worry: Not on file     Inability: Not on file    Transportation needs     Medical: Not on file     Non-medical: Not on file   Tobacco Use    Smoking status: Former Smoker    Smokeless tobacco: Former User    Tobacco comment: TOBACCO USE   Substance and Sexual Activity    Alcohol use: Yes     Comment: rarely    Drug use: No    Sexual activity: Not on file   Lifestyle    Physical activity     Days per week: Not on file     Minutes per session: Not on file    Stress: Not on file   Relationships    Social connections     Talks on phone: Not on file     Gets together: Not on file     Attends Sikh service: Not on file     Active member of club or organization: Not on file     Attends meetings of clubs or organizations: Not on file     Relationship status: Not on file    Intimate partner violence     Fear of current or ex partner: Not on file     Emotionally abused: Not on file     Physically abused: Not on file     Forced sexual activity: Not on file   Other Topics Concern    Not on file   Social History Narrative    Not on file         I have reviewed the past medical, surgical, social and family history, medications and allergies as documented in the EMR  Review of systems: ROS is negative other than that noted in the HPI  Constitutional: Negative for fatigue and fever  HENT: Negative for sore throat  Respiratory: Negative for shortness of breath  Cardiovascular: Negative for chest pain  Gastrointestinal: Negative for abdominal pain  Endocrine: Negative for cold intolerance and heat intolerance  Genitourinary: Negative for flank pain  Musculoskeletal: Negative for back pain  Skin: Negative for rash  Allergic/Immunologic: Negative for immunocompromised state  Neurological: Negative for dizziness  Psychiatric/Behavioral: Negative for agitation  Physical Exam:    Last menstrual period 08/26/2018      General/Constitutional: NAD, well developed, well nourished  HENT: Normocephalic, atraumatic  CV: Intact distal pulses, regular rate  Resp: No respiratory distress or labored breathing  Lymphatic: No lymphadenopathy palpated  Neuro: Alert and Oriented x 3, no focal deficits  Psych: Normal mood, normal affect, normal judgement, normal behavior  Skin: Warm, dry, no rashes, no erythema      Knee Exam (focused): RIGHT LEFT   ROM:   0-130 0-130   Palpation: Effusion negative negative     MJL tenderness Positive Negative     LJL tenderness Negative Negative   Meniscus: William Negative Negative    Apley's Compression Negative Negative   Instability: Varus stable stable     Valgus stable stable   Special Tests: Lachman Negative Negative     Posterior drawer Negative Negative     Anterior drawer Negative Negative     Pivot shift not tested not tested     Dial not tested not tested   Patella: Palpation no tenderness no tenderness     Mobility 1/4 1/4     Apprehension Negative Negative   Other: Single leg 1/4 squat not tested not tested      Tenderness with valgus stress of the left knee  LE NV Exam: +2 DP/PT pulses bilaterally  Sensation intact to light touch L2-S1 bilaterally     Bilateral hip ROM demonstrates no pain actively or passively    No calf tenderness to palpation bilaterally    Knee Imaging    X-rays of the right knee were reviewed, which demonstrate mild-to-moderate medial joint OA  Left greater than right  I have reviewed the radiology report and agree with their impression  Scribe Attestation    I,:    am acting as a scribe while in the presence of the attending physician :        I,:    personally performed the services described in this documentation    as scribed in my presence :          Attestation signed by Ceci Pearson DO at 8/17/2020  3:11 PM:  Attending Physician Statement  I performed history and exam of patient  I discussed with the physician assistant  I agree with the documented findings and plan of care      Please see separate note from me for todays visit

## 2020-08-17 NOTE — PROGRESS NOTES
Ortho Sports Medicine Knee New Patient Visit     Assesment:   47 y o  female right knee medial joint mild-to-moderate osteoarthritis    Plan:    Discussed options with the patient today  Patient made an informed decision to proceed with physical therapy, topical NSAIDs, and compression (via home brace)  She will follow-up in 3 months, but may cancel if she is feeling better  I suspect there could be a small meniscal tear as well  If still having trouble, may order an MRI at follow-up  Conservative treatment:    Ice to knee for 20 minutes at least 1-2 times daily  PT for ROM/strengthening to knee, hip and core  OTC NSAIDS prn for pain  Tylenol for pain  Imaging: All imaging from today was reviewed by myself and explained to the patient  Injection:    No Injection planned at this time  Surgery:     No surgery is recommended at this point, continue with conservative treatment plan as noted  Follow up:    Return in about 3 months (around 11/17/2020) for Recheck for right knee pain secondary to OA and/or small meniscal tear  Chief Complaint   Patient presents with    Right Knee - Pain       History of Present Illness: The patient is a 47 y o  female with a history of HTN, 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, 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 me by their primary care physician, seen in clinic for evaluation of right knee pain  Pain is located anterior, medial   The patient rates the pain as a 8/10  The pain has been present for 1 weeks  The patient sustained no injuries  There is no mechanism of injury  The pain is characterized as sharp, stabbing  The pain is present intermittent  Pain is improved by rest, ice and NSAIDS  Pain is aggravated by stairs, squatting, weight bearing, walking and standing  Symptoms include clicking  The patient has tried rest, ice and NSAIDS            Knee Surgical History:  None    Past Medical, Social and Family History:  Past Medical History:   Diagnosis Date    Symptomatic varicose veins of both lower extremities     LAST ASSESSED: [id number]    Urinary frequency     LAST ASSESSED: [id number]     Past Surgical History:   Procedure Laterality Date    LUMBAR DISCECTOMY      LAST ASSESSED: [id number]     No Known Allergies  Current Outpatient Medications on File Prior to Visit   Medication Sig Dispense Refill    lisinopril (ZESTRIL) 20 mg tablet Take 1 tablet (20 mg total) by mouth daily 90 tablet 0     No current facility-administered medications on file prior to visit        Social History     Socioeconomic History    Marital status: /Civil Union     Spouse name: Not on file    Number of children: Not on file    Years of education: Not on file    Highest education level: Not on file   Occupational History    Not on file   Social Needs    Financial resource strain: Not on file    Food insecurity     Worry: Not on file     Inability: Not on file   [employer] needs     Medical: Not on file     Non-medical: Not on file   Tobacco Use    Smoking status: Former Smoker    Smokeless tobacco: Former User    Tobacco comment: TOBACCO USE   Substance and Sexual Activity    Alcohol use: Yes     Comment: rarely    Drug use: No    Sexual activity: Not on file   Lifestyle    Physical activity     Days per week: Not on file     Minutes per session: Not on file    Stress: Not on file   Relationships    Social connections     Talks on phone: Not on file     Gets together: Not on file     Attends Mandaeism service: Not on file     Active member of club or organization: Not on file     Attends meetings of clubs or organizations: Not on file     Relationship status: Not on file    Intimate partner violence     Fear of current or ex partner: Not on file     Emotionally abused: Not on file     Physically abused: Not on file     Forced sexual activity: Not on file   Other Topics Concern    Not on file   Social History Narrative    Not on file         I have reviewed the past medical, surgical, social and family history, medications and allergies as documented in the EMR  Review of systems: ROS is negative other than that noted in the HPI  Constitutional: Negative for fatigue and fever  HENT: Negative for sore throat  Respiratory: Negative for shortness of breath  Cardiovascular: Negative for chest pain  Gastrointestinal: Negative for abdominal pain  Endocrine: Negative for cold intolerance and heat intolerance  Genitourinary: Negative for flank pain  Musculoskeletal: Negative for back pain  Skin: Negative for rash  Allergic/Immunologic: Negative for immunocompromised state  Neurological: Negative for dizziness  Psychiatric/Behavioral: Negative for agitation  Physical Exam:    Blood pressure 166/100, pulse 87, temperature (!) 97 4 °F (36 3 °C), height 5' 4" (1 626 m), weight 98 kg (216 lb), last menstrual period 08/26/2018  General/Constitutional: NAD, well developed, well nourished  HENT: Normocephalic, atraumatic  CV: Intact distal pulses, regular rate  Resp: No respiratory distress or labored breathing  Lymphatic: No lymphadenopathy palpated  Neuro: Alert and Oriented x 3, no focal deficits  Psych: Normal mood, normal affect, normal judgement, normal behavior  Skin: Warm, dry, no rashes, no erythema      Knee Exam (focused): RIGHT LEFT   ROM:   0-130 0-130   Palpation: Effusion negative negative     MJL tenderness Positive Negative     LJL tenderness Negative Negative   Meniscus:  William Negative Negative    Apley's Compression Negative Negative   Instability: Varus stable stable     Valgus stable stable   Special Tests: Lachman Negative Negative     Posterior drawer Negative Negative     Anterior drawer Negative Negative     Pivot shift not tested not tested     Dial not tested not tested   Patella: Palpation no tenderness no tenderness     Mobility 1/4 1/4     Apprehension Negative Negative   Other: Single leg 1/4 squat not tested not tested      Tenderness with valgus stress of the left knee  LE NV Exam: +2 DP/PT pulses bilaterally  Sensation intact to light touch L2-S1 bilaterally     Bilateral hip ROM demonstrates no pain actively or passively    No calf tenderness to palpation bilaterally    Knee Imaging    X-rays of the right knee were reviewed, which demonstrate mild-to-moderate medial joint OA  Left greater than right  I have reviewed the radiology report and agree with their impression

## 2020-08-26 ENCOUNTER — EVALUATION (OUTPATIENT)
Dept: PHYSICAL THERAPY | Facility: CLINIC | Age: 55
End: 2020-08-26
Payer: COMMERCIAL

## 2020-08-26 ENCOUNTER — APPOINTMENT (OUTPATIENT)
Dept: LAB | Facility: CLINIC | Age: 55
End: 2020-08-26
Payer: COMMERCIAL

## 2020-08-26 DIAGNOSIS — R73.9 ELEVATED BLOOD SUGAR: ICD-10-CM

## 2020-08-26 DIAGNOSIS — E78.2 MIXED HYPERLIPIDEMIA: ICD-10-CM

## 2020-08-26 DIAGNOSIS — M25.561 ACUTE PAIN OF RIGHT KNEE: ICD-10-CM

## 2020-08-26 DIAGNOSIS — I10 ESSENTIAL HYPERTENSION: ICD-10-CM

## 2020-08-26 DIAGNOSIS — R79.89 ABNORMAL TSH: ICD-10-CM

## 2020-08-26 LAB
ALBUMIN SERPL BCP-MCNC: 3.6 G/DL (ref 3.5–5)
ALP SERPL-CCNC: 83 U/L (ref 46–116)
ALT SERPL W P-5'-P-CCNC: 72 U/L (ref 12–78)
ANION GAP SERPL CALCULATED.3IONS-SCNC: 6 MMOL/L (ref 4–13)
AST SERPL W P-5'-P-CCNC: 46 U/L (ref 5–45)
BASOPHILS # BLD AUTO: 0.05 THOUSANDS/ΜL (ref 0–0.1)
BASOPHILS NFR BLD AUTO: 1 % (ref 0–1)
BILIRUB SERPL-MCNC: 1.27 MG/DL (ref 0.2–1)
BUN SERPL-MCNC: 15 MG/DL (ref 5–25)
CALCIUM SERPL-MCNC: 8.8 MG/DL (ref 8.3–10.1)
CHLORIDE SERPL-SCNC: 108 MMOL/L (ref 100–108)
CHOLEST SERPL-MCNC: 255 MG/DL (ref 50–200)
CO2 SERPL-SCNC: 27 MMOL/L (ref 21–32)
CREAT SERPL-MCNC: 0.8 MG/DL (ref 0.6–1.3)
CREAT UR-MCNC: 241 MG/DL
EOSINOPHIL # BLD AUTO: 0.29 THOUSAND/ΜL (ref 0–0.61)
EOSINOPHIL NFR BLD AUTO: 5 % (ref 0–6)
ERYTHROCYTE [DISTWIDTH] IN BLOOD BY AUTOMATED COUNT: 13.5 % (ref 11.6–15.1)
EST. AVERAGE GLUCOSE BLD GHB EST-MCNC: 114 MG/DL
GFR SERPL CREATININE-BSD FRML MDRD: 84 ML/MIN/1.73SQ M
GLUCOSE P FAST SERPL-MCNC: 138 MG/DL (ref 65–99)
HBA1C MFR BLD: 5.6 %
HCT VFR BLD AUTO: 42.4 % (ref 34.8–46.1)
HDLC SERPL-MCNC: 39 MG/DL
HGB BLD-MCNC: 14.8 G/DL (ref 11.5–15.4)
IMM GRANULOCYTES # BLD AUTO: 0.02 THOUSAND/UL (ref 0–0.2)
IMM GRANULOCYTES NFR BLD AUTO: 0 % (ref 0–2)
LDLC SERPL CALC-MCNC: 167 MG/DL (ref 0–100)
LYMPHOCYTES # BLD AUTO: 2.04 THOUSANDS/ΜL (ref 0.6–4.47)
LYMPHOCYTES NFR BLD AUTO: 33 % (ref 14–44)
MCH RBC QN AUTO: 31.2 PG (ref 26.8–34.3)
MCHC RBC AUTO-ENTMCNC: 34.9 G/DL (ref 31.4–37.4)
MCV RBC AUTO: 89 FL (ref 82–98)
MICROALBUMIN UR-MCNC: 23.4 MG/L (ref 0–20)
MICROALBUMIN/CREAT 24H UR: 10 MG/G CREATININE (ref 0–30)
MONOCYTES # BLD AUTO: 0.49 THOUSAND/ΜL (ref 0.17–1.22)
MONOCYTES NFR BLD AUTO: 8 % (ref 4–12)
NEUTROPHILS # BLD AUTO: 3.32 THOUSANDS/ΜL (ref 1.85–7.62)
NEUTS SEG NFR BLD AUTO: 53 % (ref 43–75)
NONHDLC SERPL-MCNC: 216 MG/DL
NRBC BLD AUTO-RTO: 0 /100 WBCS
PLATELET # BLD AUTO: 155 THOUSANDS/UL (ref 149–390)
PMV BLD AUTO: 11.5 FL (ref 8.9–12.7)
POTASSIUM SERPL-SCNC: 3.6 MMOL/L (ref 3.5–5.3)
PROT SERPL-MCNC: 6.7 G/DL (ref 6.4–8.2)
RBC # BLD AUTO: 4.75 MILLION/UL (ref 3.81–5.12)
SODIUM SERPL-SCNC: 141 MMOL/L (ref 136–145)
T4 FREE SERPL-MCNC: 1.04 NG/DL (ref 0.76–1.46)
TRIGL SERPL-MCNC: 247 MG/DL
TSH SERPL DL<=0.05 MIU/L-ACNC: 4.81 UIU/ML (ref 0.36–3.74)
WBC # BLD AUTO: 6.21 THOUSAND/UL (ref 4.31–10.16)

## 2020-08-26 PROCEDURE — 84439 ASSAY OF FREE THYROXINE: CPT

## 2020-08-26 PROCEDURE — 85025 COMPLETE CBC W/AUTO DIFF WBC: CPT

## 2020-08-26 PROCEDURE — 80053 COMPREHEN METABOLIC PANEL: CPT

## 2020-08-26 PROCEDURE — 97161 PT EVAL LOW COMPLEX 20 MIN: CPT | Performed by: PHYSICAL THERAPIST

## 2020-08-26 PROCEDURE — 80061 LIPID PANEL: CPT

## 2020-08-26 PROCEDURE — 84443 ASSAY THYROID STIM HORMONE: CPT

## 2020-08-26 PROCEDURE — 83036 HEMOGLOBIN GLYCOSYLATED A1C: CPT

## 2020-08-26 PROCEDURE — 82570 ASSAY OF URINE CREATININE: CPT | Performed by: PHYSICIAN ASSISTANT

## 2020-08-26 PROCEDURE — 82043 UR ALBUMIN QUANTITATIVE: CPT | Performed by: PHYSICIAN ASSISTANT

## 2020-08-26 PROCEDURE — 97110 THERAPEUTIC EXERCISES: CPT | Performed by: PHYSICAL THERAPIST

## 2020-08-26 PROCEDURE — 36415 COLL VENOUS BLD VENIPUNCTURE: CPT

## 2020-08-26 NOTE — PROGRESS NOTES
PT Evaluation     Today's date: 2020  Patient name: Lety Lozada  : 1965  MRN: [id number]  Referring provider: Kate Dawn DO  Dx:   Encounter Diagnosis     ICD-10-CM    1  Acute pain of right knee  M25 [id number] Ambulatory referral to Physical Therapy                  Assessment  Assessment details: Lety Lozada is a 47 y o  female who presents to physical therapy with diagnosis of acute right knee pain  Roseann Patterson presents with pain, abnormal gait, and limitations in range of motion, strength, joint mobility, flexibility, and functional ability  The current limitations are affecting Courtney's ability to function at prior level  She would benefit from skilled physical therapy to address the current impairments and functional limitations to enable her to return to daily activities at premorbid status   Thank you for the referral      Impairments: abnormal gait, activity intolerance, impaired balance, impaired physical strength and lacks appropriate home exercise program    Symptom irritability: lowUnderstanding of Dx/Px/POC: good   Prognosis: good    Goals  STG  Patient will decrease pain at worst to 5/10  Patient will demonstrate 120 degrees knee flexion on R  Patient will improve ambulation tolerance to 20 minutes without pain  Patient will be independent with basic HEP    LTG  Patient will decrease pain at worst to 1/10  Patient will improve ambulation tolerance to 30 minutes  Patient will demonstrate 5/5 strength on RLE  Patient will be independent with comprehensive HEP      Plan  Patient would benefit from: skilled physical therapy  Planned modality interventions: cryotherapy and thermotherapy: hydrocollator packs  Planned therapy interventions: manual therapy, neuromuscular re-education, patient education, therapeutic activities, therapeutic exercise, therapeutic training and home exercise program  Frequency: 2x week        Subjective Evaluation    History of Present Illness  Mechanism of injury: Roseann Patterson is a 47 y o  female presenting to physical therapy on 20 with primary complaints of right knee pain  She reports it started around 2 weeks ago  She mentions it has happened about 2 years ago and it hurt for a few weeks and then it stopped  She reports she did have a back surgery a few years ago and the dr said she favored the right  She reports dr  saw arthritis in the knee, possible meniscus involvement but would need an MRI for that  They agreed to trial therapy first      She reports walking is the hardest, she states it hurts as soon as she stands up but she needs to stop after about 5 minutes  She mentions at times it is extreamly painful, feels like there is a toothache in her knee  She mentions she is able to negotiate steps in a [employer] patern but it is painful  She mentions it has been feeling a little better the past few days, she has been putting cream on it, taking advil, and using ice  She reports she has an apt next week to address vascular concern on her R leg which has been going on for a few years  Pain  Current pain rating: 3  At best pain rating: 3  At worst pain ratin  Location: medial knee  Quality: dull ache and throbbing  Relieving factors: ice, rest and medications  Aggravating factors: walking, standing and stair climbing  Progression: improved    Social Support  Steps to enter house: yes  Stairs in house: yes   Lives with: adult children and spouse    Employment status: working    Diagnostic Tests  X-ray: abnormal (OA)  Treatments  No previous or current treatments  Patient Goals  Patient goals for therapy: decreased pain and increased strength  Patient goal: walk longer distances without pain  Objective     Tenderness     Right Knee   Tenderness in the medial joint line       Neurological Testing     Sensation     Knee   Left Knee   Intact: light touch    Right Knee   Intact: light touch     Active Range of Motion   Left Knee   Flexion: 120 degrees   Extension: 0 degrees     Right Knee   Flexion: 110 degrees with pain  Extension: 0 degrees with pain    Additional Active Range of Motion Details  P! At end range    Mobility     Additional Mobility Details  Hypomobile R medial/lateral patellar mobility  Strength/Myotome Testing     Left Hip   Planes of Motion   Flexion: 5  Extension: 4+  Abduction: 4    Right Hip   Planes of Motion   Flexion: 5  Extension: 4+  Abduction: 4    Left Knee   Flexion: 5  Extension: 5  Quadriceps contraction: good    Right Knee   Flexion: 4+  Extension: 4+  Quadriceps contraction: good    Additional Strength Details  Slight pain with quad contraction    Tests     Additional Tests Details  Negative varus, valgus, anterior drawers, posterior drawers, Apleys compression  Slight decrease in pain with Apleys distraction  Unable to test McMurrays secondary to patient unable to relax leg  General Comments:      Knee Comments  Gait: decreased stance phase on RLE, moderate trunk lean, no AD  Flexibility: moderate tightness, hamstring, hip flexor, and gastroc on R          Flowsheet Rows      Most Recent Value   PT/OT G-Codes   Current Score  21   Projected Score  57             Precautions: HTN, Hyperlipidemia, Obesity      Manuals 8/26            R knee PROM             Patellar Mobs                                       Neuro Re-Ed             SLS Balance                                                                                           Ther Ex             Bike             SLR 2x10            Gastroc Strap Stretch 20"x4            Mini Squat 10x            Hamstring Stretch             Heel Raises             Clamshells                          Ther Activity             Step Ups fwd             Step Ups lateral             Eccentric Step Down                          Gait Training                                       Modalities

## 2020-08-28 ENCOUNTER — TELEPHONE (OUTPATIENT)
Dept: ADMINISTRATIVE | Facility: HOSPITAL | Age: 55
End: 2020-08-28

## 2020-09-03 ENCOUNTER — OFFICE VISIT (OUTPATIENT)
Dept: FAMILY MEDICINE CLINIC | Facility: CLINIC | Age: 55
End: 2020-09-03
Payer: COMMERCIAL

## 2020-09-03 VITALS
DIASTOLIC BLOOD PRESSURE: 98 MMHG | BODY MASS INDEX: 37.05 KG/M2 | WEIGHT: 217 LBS | OXYGEN SATURATION: 78 % | SYSTOLIC BLOOD PRESSURE: 128 MMHG | HEIGHT: 64 IN | TEMPERATURE: 97.8 F | HEART RATE: 98 BPM

## 2020-09-03 DIAGNOSIS — I10 BENIGN ESSENTIAL HYPERTENSION: Primary | ICD-10-CM

## 2020-09-03 DIAGNOSIS — R79.89 ABNORMAL TSH: ICD-10-CM

## 2020-09-03 DIAGNOSIS — R73.9 ELEVATED BLOOD SUGAR: ICD-10-CM

## 2020-09-03 DIAGNOSIS — Z12.11 SCREENING FOR COLON CANCER: ICD-10-CM

## 2020-09-03 DIAGNOSIS — I10 ESSENTIAL HYPERTENSION: ICD-10-CM

## 2020-09-03 DIAGNOSIS — E78.2 MIXED HYPERLIPIDEMIA: ICD-10-CM

## 2020-09-03 PROCEDURE — 99214 OFFICE O/P EST MOD 30 MIN: CPT | Performed by: FAMILY MEDICINE

## 2020-09-03 RX ORDER — LISINOPRIL 40 MG/1
40 TABLET ORAL DAILY
Qty: 90 TABLET | Refills: 1 | Status: SHIPPED | OUTPATIENT
Start: 2020-09-03 | End: 2021-03-03 | Stop reason: ALTCHOICE

## 2020-09-03 NOTE — PROGRESS NOTES
Assessment/Plan:    Patient is a 55-year-old female seen for follow-up chronic medical conditions  She is taking medication for hypertension  Her blood pressure is not well controlled and I have increased her lisinopril from 20-40 mg    her cholesterol has come down, it is now 255  Her triglycerides have come down from 386 to 247  She Is taking Fish Oil    her TSH is mildly elevated  It had been higher a couple years ago and she has not taken medication  We will repeat her TSH along with fasting blood work and I will see her back in 6 months  She was also given orders for colonoscopy  Diagnoses and all orders for this visit:    Benign essential hypertension  -     Comprehensive metabolic panel; Future    Screening for colon cancer  -     Ambulatory referral for colonoscopy; Future    Mixed hyperlipidemia  -     TSH, 3rd generation with Free T4 reflex; Future  -     Lipid Panel with Direct LDL reflex; Future    Elevated blood sugar  -     HEMOGLOBIN A1C W/ EAG ESTIMATION; Future    Abnormal TSH  -     TSH, 3rd generation with Free T4 reflex; Future    BMI 37 0-37 9, adult    Essential hypertension  -     Comprehensive metabolic panel; Future  -     lisinopril (ZESTRIL) 40 mg tablet; Take 1 tablet (40 mg total) by mouth daily          Subjective:   Chief Complaint   Patient presents with    Follow-up     discuss bw and venus duplex        Patient ID: Jazzmine Cohen is a 47 y o  female  Patient is here for follow up of chronic medical conditions  She is compliant with her blood pressure medication  She had fasting blood work to discuss  She has concerns about her leg  She had what she thought was a bug bite and then developed swelling  She had 2 venous Dopplers which showed a fluid collection  There was no blood clot        The following portions of the patient's history were reviewed and updated as appropriate: allergies, current medications, past family history, past medical history, past social history, past surgical history and problem list     Review of Systems   Constitutional: Negative for chills and fever  HENT: Negative for congestion and sore throat  Respiratory: Negative for chest tightness  Cardiovascular: Negative for chest pain and palpitations  Gastrointestinal: Negative for abdominal pain, constipation, diarrhea and nausea  Genitourinary: Negative for difficulty urinating  Musculoskeletal: Positive for gait problem and joint swelling  Skin: Negative  Neurological: Negative for dizziness and headaches  Psychiatric/Behavioral: Negative  Objective:      /98 (BP Location: Right arm, Patient Position: Sitting)   Pulse 98   Temp 97 8 °F (36 6 °C) (Tympanic)   Ht 5' 4" (1 626 m)   Wt 98 4 kg (217 lb)   LMP 08/26/2018   SpO2 (!) 78%   BMI 37 25 kg/m²          Physical Exam  Vitals signs and nursing note reviewed  Constitutional:       General: She is not in acute distress  Appearance: She is obese  Neck:      Thyroid: No thyromegaly  Cardiovascular:      Rate and Rhythm: Normal rate and regular rhythm  Heart sounds: Normal heart sounds  Pulmonary:      Effort: Pulmonary effort is normal       Breath sounds: Normal breath sounds  Musculoskeletal:         General: Swelling (left mid calf posteriorly  Swollen, not hard ) present  Lymphadenopathy:      Cervical: No cervical adenopathy  Skin:     General: Skin is warm and dry  Neurological:      Mental Status: She is alert and oriented to person, place, and time  Psychiatric:         Mood and Affect: Mood normal          BMI Counseling: Body mass index is 37 25 kg/m²  The BMI is above normal  Nutrition recommendations include encouraging healthy choices of fruits and vegetables, moderation in carbohydrate intake, increasing intake of lean protein and reducing intake of saturated and trans fat  Exercise recommendations include moderate physical activity 150 minutes/week   No pharmacotherapy was ordered

## 2020-09-03 NOTE — PATIENT INSTRUCTIONS
Low Fat Diet   WHAT YOU NEED TO KNOW:   A low-fat diet is an eating plan that is low in total fat, unhealthy fat, and cholesterol  You may need to follow a low-fat diet if you have trouble digesting or absorbing fat  You may also need to follow this diet if you have high cholesterol  You can also lower your cholesterol by increasing the amount of fiber in your diet  Soluble fiber is a type of fiber that helps to decrease cholesterol levels  DISCHARGE INSTRUCTIONS:   Different types of fat in food:   · Limit unhealthy fats  A diet that is high in cholesterol, saturated fat, and trans fat may cause unhealthy cholesterol levels  Unhealthy cholesterol levels increase your risk of heart disease  ¨ Cholesterol:  Limit intake of cholesterol to less than 200 mg per day  Cholesterol is found in meat, eggs, and dairy  ¨ Saturated fat:  Limit saturated fat to less than 7% of your total daily calories  Ask your dietitian how many calories you need each day  Saturated fat is found in butter, cheese, ice cream, whole milk, and palm oil  Saturated fat is also found in meat, such as beef, pork, chicken skin, and processed meats  Processed meats include sausage, hot dogs, and bologna  ¨ Trans fat:  Avoid trans fat as much as possible  Trans fat is used in fried and baked foods  Foods that say trans fat free on the label may still have up to 0 5 grams of trans fat per serving  · Include healthy fats  Replace foods that are high in saturated and trans fat with foods high in healthy fats  This may help to decrease high cholesterol levels  ¨ Monounsaturated fats: These are found in avocados, nuts, and vegetable oils, such as olive, canola, and sunflower oil  ¨ Polyunsaturated fats: These can be found in vegetable oils, such as soybean or corn oil  Omega-3 fats can help to decrease the risk of heart disease  Omega-3 fats are found in fish, such as salmon, herring, trout, and tuna   Omega-3 fats can also be found in plant foods, such as walnuts, flaxseed, soybeans, and canola oil    Foods to limit or avoid:   · Grains:      ¨ Snacks that are made with partially hydrogenated oils, such as chips, regular crackers, and butter-flavored popcorn    ¨ High-fat baked goods, such as biscuits, croissants, doughnuts, pies, cookies, and pastries    · Dairy:      ¨ Whole milk, 2% milk, and yogurt and ice cream made with whole milk    ¨ Half and half creamer, heavy cream, and whipping cream    ¨ Cheese, cream cheese, and sour cream    · Meats and proteins:      ¨ High-fat cuts of meat (T-bone steak, regular hamburger, and ribs)    ¨ Fried meat, poultry (turkey and chicken), and fish    ¨ Poultry (chicken and turkey) with skin    ¨ Cold cuts (salami or bologna), hot dogs, cheng, and sausage    ¨ Whole eggs and egg yolks    · Vegetables and fruits with added fat:      ¨ Fried vegetables or vegetables in butter or high-fat sauces, such as cream or cheese sauces    ¨ Fried fruit or fruit served with butter or cream    · Fats:      ¨ Butter, stick margarine, and shortening    ¨ Coconut, palm oil, and palm kernel oil  Foods to include:   · Grains:      ¨ Whole-grain breads, cereals, pasta, and brown rice    ¨ Low-fat crackers and pretzels    · Vegetables and fruits:      ¨ Fresh, frozen, or canned vegetables (no salt or low-sodium)    ¨ Fresh, frozen, dried, or canned fruit (canned in light syrup or fruit juice)    ¨ Avocado    · Low-fat dairy products:      ¨ Nonfat (skim) or 1% milk    ¨ Nonfat or low-fat cheese, yogurt, and cottage cheese    · Meats and proteins:      ¨ Chicken or turkey with no skin    ¨ Baked or broiled fish    ¨ Lean beef and pork (loin, round, extra lean hamburger)    ¨ Beans and peas, unsalted nuts, soy products    ¨ Egg whites and substitutes    ¨ Seeds and nuts    · Fats:      ¨ Unsaturated oil, such as canola, olive, peanut, soybean, or sunflower oil    ¨ Soft or liquid margarine and vegetable oil spread    ¨ Low-fat salad dressing  Other ways to decrease fat:   · Read food labels before you buy foods  Choose foods that have less than 30% of calories from fat  Choose low-fat or fat-free dairy products  Remember that fat free does not mean calorie free  These foods still contain calories, and too many calories can lead to weight gain  · Trim fat from meat and avoid fried food  Trim all visible fat from meat before you cook it  Remove the skin from poultry  Do not cantrell meat, fish, or poultry  Bake, roast, boil, or broil these foods instead  Avoid fried foods  Eat a baked potato instead of [place] fries  Steam vegetables instead of sautéing them in butter  · Add less fat to foods  Use imitation cheng bits on salads and baked potatoes instead of regular cheng bits  Use fat-free or low-fat salad dressings instead of regular dressings  Use low-fat or nonfat butter-flavored topping instead of regular butter or margarine on popcorn and other foods  Ways to decrease fat in recipes:  Replace high-fat ingredients with low-fat or nonfat ones  This may cause baked goods to be drier than usual  You may need to use nonfat cooking spray on pans to prevent food from sticking  You also may need to change the amount of other ingredients, such as water, in the recipe  Try the following:  · Use low-fat or light margarine instead of regular margarine or shortening  · Use lean ground turkey breast or chicken, or lean ground beef (less than 5% fat) instead of hamburger  · Add 1 teaspoon of canola oil to 8 ounces of skim milk instead of using cream or half and half  · Use grated zucchini, carrots, or apples in breads instead of coconut  · Use blenderized, low-fat cottage cheese, plain tofu, or low-fat ricotta cheese instead of cream cheese  · Use 1 egg white and 1 teaspoon of canola oil, or use ¼ cup (2 ounces) of fat-free egg substitute instead of a whole egg       · Replace half of the oil that is called for in a recipe with applesauce when you bake  Use 3 tablespoons of cocoa powder and 1 tablespoon of canola oil instead of a square of baking chocolate  How to increase fiber:  Eat enough high-fiber foods to get 20 to 30 grams of fiber every day  Slowly increase your fiber intake to avoid stomach cramps, gas, and other problems  · Eat 3 ounces of whole-grain foods each day  An ounce is about 1 slice of bread  Eat whole-grain breads, such as whole-wheat bread  Whole wheat, whole-wheat flour, or other whole grains should be listed as the first ingredient on the food label  Replace white flour with whole-grain flour or use half of each in recipes  Whole-grain flour is heavier than white flour, so you may have to add more yeast or baking powder  · Eat a high-fiber cereal for breakfast   Oatmeal is a good source of soluble fiber  Look for cereals that have bran or fiber in the name  Choose whole-grain products, such as brown rice, barley, and whole-wheat pasta  · Eat more beans, peas, and lentils  For example, add beans to soups or salads  Eat at least 5 cups of fruits and vegetables each day  Eat fruits and vegetables with the peel because the peel is high in fiber  © 2017 [employer]  Information is for End User's use only and may not be sold, redistributed or otherwise used for commercial purposes  All illustrations and images included in CareNotes® are the copyrighted property of [employer]  or [employer]  The above information is an  only  It is not intended as medical advice for individual conditions or treatments  Talk to your doctor, nurse or pharmacist before following any medical regimen to see if it is safe and effective for you

## 2020-09-18 ENCOUNTER — TELEPHONE (OUTPATIENT)
Dept: VASCULAR SURGERY | Facility: CLINIC | Age: 55
End: 2020-09-18

## 2020-09-18 NOTE — TELEPHONE ENCOUNTER
COVID Pre-Visit Screening     1  Is this a family member screening? No  2  Have you traveled outside of your state in the past 2 weeks? No  3  Do you presently have a fever or flu-like symptoms? No  4  Do you have symptoms of an upper respiratory infection like runny nose, sore throat, or cough? No  5  Are you suffering from new headache that you have not had in the past?  No  6  Do you have/have you experienced any new shortness of breath recently? No  7  Do you have any new diarrhea, nausea or vomiting? No  8  Have you been in contact with anyone who has been sick or diagnosed with COVID-19? No  9  Do you have any new loss of taste or smell? No  10  Are you able to wear a mask without a valve for the entire visit? Yes  Confirmed with patient provider, date, time and location address  Patient verbalized understanding  Sheryl Padron

## 2020-09-21 ENCOUNTER — CONSULT (OUTPATIENT)
Dept: VASCULAR SURGERY | Facility: CLINIC | Age: 55
End: 2020-09-21
Payer: COMMERCIAL

## 2020-09-21 VITALS
DIASTOLIC BLOOD PRESSURE: 80 MMHG | HEART RATE: 92 BPM | SYSTOLIC BLOOD PRESSURE: 138 MMHG | BODY MASS INDEX: 37.22 KG/M2 | WEIGHT: 218 LBS | TEMPERATURE: 95.7 F | HEIGHT: 64 IN

## 2020-09-21 DIAGNOSIS — I83.893 SYMPTOMATIC VARICOSE VEINS OF BOTH LOWER EXTREMITIES: Primary | Chronic | ICD-10-CM

## 2020-09-21 DIAGNOSIS — I83.813 VARICOSE VEINS OF BOTH LOWER EXTREMITIES WITH PAIN: ICD-10-CM

## 2020-09-21 PROCEDURE — 99243 OFF/OP CNSLTJ NEW/EST LOW 30: CPT | Performed by: SURGERY

## 2020-09-21 NOTE — PATIENT INSTRUCTIONS
Symptomatic varicose veins of both lower extremities  Symptomatic varicose veins right lower extremity  We discussed the pathophysiology of venous disease, the indications for treatment and the treatment options available  We will obtain a venous reflux study and following this make further treatment recommendations  She has chronically utilize compressive stockings which I have encouraged her to continue

## 2020-09-21 NOTE — ASSESSMENT & PLAN NOTE
Symptomatic varicose veins right lower extremity  We discussed the pathophysiology of venous disease, the indications for treatment and the treatment options available  We will obtain a venous reflux study and following this make further treatment recommendations  She has chronically utilize compressive stockings which I have encouraged her to continue

## 2020-09-21 NOTE — PROGRESS NOTES
Assessment/Plan:    Symptomatic varicose veins of both lower extremities  Symptomatic varicose veins right lower extremity  We discussed the pathophysiology of venous disease, the indications for treatment and the treatment options available  We will obtain a venous reflux study and following this make further treatment recommendations  She has chronically utilize compressive stockings which I have encouraged her to continue  Diagnoses and all orders for this visit:    Symptomatic varicose veins of both lower extremities  -     VAS reflux lower limb venous duplex study with reflux assessment, complete bilateral; Future    Varicose veins of both lower extremities with pain  -     Ambulatory referral to Vascular Surgery          Subjective:      Patient ID: Isaac Lobato is a 47 y o  female  Patient did see Marciano payton in 2017, she is here to re establish care  Patient denies any HX of any varicose vein surgery  Patient denies any HX of DVTs, PE  Patient last LEV was done on 7/24/20  Patient C/o of BL ankle swelling W/ long periods of sitting and standing  She C/o of Tiredness, heaviness, aching in the BL LE  R>L patient has some truncal varicosities in the BL LE, especially one in the R leg that extends from right below her knee to her upper thigh  She does wear compression w/o relief  49-year-old with long history of symptomatic varicose veins more severe on the right as compared to left  She has previously been seen and treated with conservative management to include compressive stockings  She notes improvement with the use of the compressive stockings but despite daily use there has been some progression of varicosities and associated symptoms  She notes discomfort throughout the right lower extremity towards the end of the day and especially with period of standing  She does note some relief with elevation  She notes some similar but much less severe symptoms in the left leg    She denies history of deep or superficial venous thrombosis  Venous duplex studies 07/07/2020 and 7/24/20 show no evidence of DVT  The following portions of the patient's history were reviewed and updated as appropriate: allergies, current medications, past family history, past medical history, past social history, past surgical history and problem list     I have reviewed and made appropriate changes to the review of systems input by the medical assistant      Vitals:    09/21/20 1420   BP: 138/80   BP Location: Left arm   Patient Position: Sitting   Cuff Size: Standard   Pulse: 92   Temp: (!) 95 7 °F (35 4 °C)   TempSrc: Tympanic   Weight: 98 9 kg (218 lb)   Height: 5' 4" (1 626 m)       Patient Active Problem List   Diagnosis    Benign essential hypertension    Elevated blood sugar    Mixed hyperlipidemia    Abnormal TSH    Morbid obesity (HCC)    Symptomatic varicose veins of both lower extremities       Past Surgical History:   Procedure Laterality Date    LUMBAR DISCECTOMY      LAST ASSESSED: [id number]       Family History   Problem Relation Age of Onset    No Known Problems Mother     Hypertension Father        Social History     Socioeconomic History    Marital status: /Civil Union     Spouse name: Not on file    Number of children: Not on file    Years of education: Not on file    Highest education level: Not on file   Occupational History    Not on file   Social Needs    Financial resource strain: Not on file    Food insecurity     Worry: Not on file     Inability: Not on file   [employer] needs     Medical: Not on file     Non-medical: Not on file   Tobacco Use    Smoking status: Former Smoker    Smokeless tobacco: Former User    Tobacco comment: TOBACCO USE   Substance and Sexual Activity    Alcohol use: Yes     Comment: rarely    Drug use: No    Sexual activity: Not on file   Lifestyle    Physical activity     Days per week: Not on file     Minutes per session: Not on file    Stress: Not on file   Relationships    Social connections     Talks on phone: Not on file     Gets together: Not on file     Attends [employer] service: Not on file     Active member of club or organization: Not on file     Attends meetings of clubs or organizations: Not on file     Relationship status: Not on file    Intimate partner violence     Fear of current or ex partner: Not on file     Emotionally abused: Not on file     Physically abused: Not on file     Forced sexual activity: Not on file   Other Topics Concern    Not on file   Social History Narrative    Not on file       No Known Allergies      Current Outpatient Medications:     lisinopril (ZESTRIL) 40 mg tablet, Take 1 tablet (40 mg total) by mouth daily, Disp: 90 tablet, Rfl: 1    Review of Systems   Constitutional: Negative  Negative for chills and fever  HENT: Negative  Eyes: Negative  Respiratory: Negative  Negative for chest tightness and shortness of breath  Cardiovascular: Positive for leg swelling (W/ long periods of sitting and standing mostly in ankles )  Negative for chest pain  Gastrointestinal: Negative  Endocrine: Negative  Genitourinary: Negative  Musculoskeletal:        Tiredness, heaviness, aching R>L   Skin: Negative  Negative for color change and wound  Truncal varicosities in the BL LE, especially one in the R leg that extends from right below her knee to her upper thigh    Allergic/Immunologic: Negative  Neurological: Negative  Hematological: Negative  Psychiatric/Behavioral: Negative  Objective:      /80 (BP Location: Left arm, Patient Position: Sitting, Cuff Size: Standard)   Pulse 92   Temp (!) 95 7 °F (35 4 °C) (Tympanic)   Ht 5' 4" (1 626 m)   Wt 98 9 kg (218 lb)   LMP 08/26/2018   BMI 37 42 kg/m²          Physical Exam  Constitutional:       Appearance: Normal appearance  She is obese  Cardiovascular:      Rate and Rhythm: Normal rate  Pulses:           Radial pulses are 2+ on the right side and 2+ on the left side  Comments: On the right there is a large truncal varicosity over the anterior thigh and anterior calf consistent with an accessory anterior greater saphenous vein  This vein is 6-8 mm in diameter throughout its length  There are additional varicosities in the medial aspect of the calf of similar size  On the left there are varicosities within the calf 4-6 mm in diameter  Musculoskeletal: Normal range of motion  General: Swelling (Mild more severe on the right as compared to left) present  Skin:     General: Skin is warm and dry  Neurological:      General: No focal deficit present  Mental Status: She is alert and oriented to person, place, and time     Psychiatric:         Mood and Affect: Mood normal

## 2020-10-10 ENCOUNTER — TELEPHONE (OUTPATIENT)
Dept: GASTROENTEROLOGY | Facility: CLINIC | Age: 55
End: 2020-10-10

## 2020-10-16 ENCOUNTER — HOSPITAL ENCOUNTER (OUTPATIENT)
Dept: NON INVASIVE DIAGNOSTICS | Facility: CLINIC | Age: 55
Discharge: HOME | End: 2020-10-16
Payer: COMMERCIAL

## 2020-10-16 DIAGNOSIS — I83.893 SYMPTOMATIC VARICOSE VEINS OF BOTH LOWER EXTREMITIES: Chronic | ICD-10-CM

## 2020-10-16 PROCEDURE — 93970 EXTREMITY STUDY: CPT

## 2020-10-19 ENCOUNTER — PREP FOR PROCEDURE (OUTPATIENT)
Dept: VASCULAR SURGERY | Facility: CLINIC | Age: 55
End: 2020-10-19

## 2020-10-19 ENCOUNTER — TELEPHONE (OUTPATIENT)
Dept: VASCULAR SURGERY | Facility: CLINIC | Age: 55
End: 2020-10-19

## 2020-10-19 ENCOUNTER — OFFICE VISIT (OUTPATIENT)
Dept: VASCULAR SURGERY | Facility: CLINIC | Age: 55
End: 2020-10-19
Payer: COMMERCIAL

## 2020-10-19 VITALS
RESPIRATION RATE: 18 BRPM | WEIGHT: 222 LBS | TEMPERATURE: 98.5 F | HEIGHT: 64 IN | DIASTOLIC BLOOD PRESSURE: 84 MMHG | BODY MASS INDEX: 37.9 KG/M2 | SYSTOLIC BLOOD PRESSURE: 130 MMHG | HEART RATE: 85 BPM

## 2020-10-19 DIAGNOSIS — I83.893 SYMPTOMATIC VARICOSE VEINS OF BOTH LOWER EXTREMITIES: Primary | Chronic | ICD-10-CM

## 2020-10-19 PROCEDURE — 1036F TOBACCO NON-USER: CPT | Performed by: SURGERY

## 2020-10-19 PROCEDURE — 3079F DIAST BP 80-89 MM HG: CPT | Performed by: SURGERY

## 2020-10-19 PROCEDURE — 99213 OFFICE O/P EST LOW 20 MIN: CPT | Performed by: SURGERY

## 2020-10-19 PROCEDURE — 93970 EXTREMITY STUDY: CPT | Performed by: SURGERY

## 2020-10-19 RX ORDER — CHLORHEXIDINE GLUCONATE 0.12 MG/ML
15 RINSE ORAL ONCE
Status: CANCELLED | OUTPATIENT
Start: 2020-11-10 | End: 2020-10-19

## 2020-10-19 RX ORDER — CEFAZOLIN SODIUM 2 G/50ML
2000 SOLUTION INTRAVENOUS ONCE
Status: CANCELLED | OUTPATIENT
Start: 2020-11-10 | End: 2020-10-19

## 2020-11-09 ENCOUNTER — ANESTHESIA EVENT (OUTPATIENT)
Dept: PERIOP | Facility: HOSPITAL | Age: 55
End: 2020-11-09
Payer: COMMERCIAL

## 2020-11-10 ENCOUNTER — ANESTHESIA (OUTPATIENT)
Dept: PERIOP | Facility: HOSPITAL | Age: 55
End: 2020-11-10
Payer: COMMERCIAL

## 2020-11-10 ENCOUNTER — HOSPITAL ENCOUNTER (OUTPATIENT)
Facility: HOSPITAL | Age: 55
Setting detail: OUTPATIENT SURGERY
Discharge: HOME | End: 2020-11-10
Attending: SURGERY | Admitting: SURGERY
Payer: COMMERCIAL

## 2020-11-10 VITALS
DIASTOLIC BLOOD PRESSURE: 87 MMHG | HEART RATE: 71 BPM | BODY MASS INDEX: 37.54 KG/M2 | RESPIRATION RATE: 16 BRPM | HEIGHT: 62 IN | OXYGEN SATURATION: 96 % | WEIGHT: 204 LBS | TEMPERATURE: 97.5 F | SYSTOLIC BLOOD PRESSURE: 157 MMHG

## 2020-11-10 VITALS — HEART RATE: 108 BPM

## 2020-11-10 DIAGNOSIS — I83.893 SYMPTOMATIC VARICOSE VEINS OF BOTH LOWER EXTREMITIES: Primary | Chronic | ICD-10-CM

## 2020-11-10 PROCEDURE — 37700 LIGATION&DIV LONG SAPH VEIN: CPT | Performed by: SURGERY

## 2020-11-10 PROCEDURE — 37700 LIGATION&DIV LONG SAPH VEIN: CPT | Performed by: PHYSICIAN ASSISTANT

## 2020-11-10 PROCEDURE — 37766 PHLEB VEINS - EXTREM 20+: CPT | Performed by: PHYSICIAN ASSISTANT

## 2020-11-10 PROCEDURE — 37766 PHLEB VEINS - EXTREM 20+: CPT | Performed by: SURGERY

## 2020-11-10 RX ORDER — OXYCODONE HYDROCHLORIDE AND ACETAMINOPHEN 5; 325 MG/1; MG/1
1 TABLET ORAL ONCE
Status: COMPLETED | OUTPATIENT
Start: 2020-11-10 | End: 2020-11-10

## 2020-11-10 RX ORDER — MIDAZOLAM HYDROCHLORIDE 2 MG/2ML
INJECTION, SOLUTION INTRAMUSCULAR; INTRAVENOUS AS NEEDED
Status: DISCONTINUED | OUTPATIENT
Start: 2020-11-10 | End: 2020-11-10

## 2020-11-10 RX ORDER — NEOSTIGMINE METHYLSULFATE 1 MG/ML
INJECTION INTRAVENOUS AS NEEDED
Status: DISCONTINUED | OUTPATIENT
Start: 2020-11-10 | End: 2020-11-10

## 2020-11-10 RX ORDER — CEFAZOLIN SODIUM 2 G/50ML
SOLUTION INTRAVENOUS AS NEEDED
Status: DISCONTINUED | OUTPATIENT
Start: 2020-11-10 | End: 2020-11-10

## 2020-11-10 RX ORDER — PROPOFOL 10 MG/ML
INJECTION, EMULSION INTRAVENOUS AS NEEDED
Status: DISCONTINUED | OUTPATIENT
Start: 2020-11-10 | End: 2020-11-10

## 2020-11-10 RX ORDER — LIDOCAINE HYDROCHLORIDE 20 MG/ML
INJECTION, SOLUTION EPIDURAL; INFILTRATION; INTRACAUDAL; PERINEURAL AS NEEDED
Status: DISCONTINUED | OUTPATIENT
Start: 2020-11-10 | End: 2020-11-10

## 2020-11-10 RX ORDER — ONDANSETRON 2 MG/ML
4 INJECTION INTRAMUSCULAR; INTRAVENOUS ONCE AS NEEDED
Status: DISCONTINUED | OUTPATIENT
Start: 2020-11-10 | End: 2020-11-10 | Stop reason: HOSPADM

## 2020-11-10 RX ORDER — OXYCODONE HYDROCHLORIDE AND ACETAMINOPHEN 5; 325 MG/1; MG/1
1 TABLET ORAL EVERY 4 HOURS PRN
Qty: 10 TABLET | Refills: 0 | Status: SHIPPED | OUTPATIENT
Start: 2020-11-10 | End: 2020-11-20

## 2020-11-10 RX ORDER — CHLORHEXIDINE GLUCONATE 0.12 MG/ML
15 RINSE ORAL ONCE
Status: COMPLETED | OUTPATIENT
Start: 2020-11-10 | End: 2020-11-10

## 2020-11-10 RX ORDER — GLYCOPYRROLATE 0.2 MG/ML
INJECTION INTRAMUSCULAR; INTRAVENOUS AS NEEDED
Status: DISCONTINUED | OUTPATIENT
Start: 2020-11-10 | End: 2020-11-10

## 2020-11-10 RX ORDER — CEFAZOLIN SODIUM 2 G/50ML
2000 SOLUTION INTRAVENOUS ONCE
Status: COMPLETED | OUTPATIENT
Start: 2020-11-10 | End: 2020-11-10

## 2020-11-10 RX ORDER — LORAZEPAM 2 MG/ML
1 INJECTION INTRAMUSCULAR ONCE
Status: COMPLETED | OUTPATIENT
Start: 2020-11-10 | End: 2020-11-10

## 2020-11-10 RX ORDER — MAGNESIUM HYDROXIDE 1200 MG/15ML
LIQUID ORAL AS NEEDED
Status: DISCONTINUED | OUTPATIENT
Start: 2020-11-10 | End: 2020-11-10 | Stop reason: HOSPADM

## 2020-11-10 RX ORDER — ROCURONIUM BROMIDE 10 MG/ML
INJECTION, SOLUTION INTRAVENOUS AS NEEDED
Status: DISCONTINUED | OUTPATIENT
Start: 2020-11-10 | End: 2020-11-10

## 2020-11-10 RX ORDER — FENTANYL CITRATE 50 UG/ML
INJECTION, SOLUTION INTRAMUSCULAR; INTRAVENOUS AS NEEDED
Status: DISCONTINUED | OUTPATIENT
Start: 2020-11-10 | End: 2020-11-10

## 2020-11-10 RX ORDER — SODIUM CHLORIDE 9 MG/ML
125 INJECTION, SOLUTION INTRAVENOUS CONTINUOUS
Status: DISCONTINUED | OUTPATIENT
Start: 2020-11-10 | End: 2020-11-10 | Stop reason: HOSPADM

## 2020-11-10 RX ORDER — FENTANYL CITRATE/PF 50 MCG/ML
25 SYRINGE (ML) INJECTION
Status: DISCONTINUED | OUTPATIENT
Start: 2020-11-10 | End: 2020-11-10 | Stop reason: HOSPADM

## 2020-11-10 RX ORDER — BUPIVACAINE HYDROCHLORIDE 2.5 MG/ML
INJECTION, SOLUTION EPIDURAL; INFILTRATION; INTRACAUDAL AS NEEDED
Status: DISCONTINUED | OUTPATIENT
Start: 2020-11-10 | End: 2020-11-10 | Stop reason: HOSPADM

## 2020-11-10 RX ORDER — DEXAMETHASONE SODIUM PHOSPHATE 4 MG/ML
INJECTION, SOLUTION INTRA-ARTICULAR; INTRALESIONAL; INTRAMUSCULAR; INTRAVENOUS; SOFT TISSUE AS NEEDED
Status: DISCONTINUED | OUTPATIENT
Start: 2020-11-10 | End: 2020-11-10

## 2020-11-10 RX ORDER — ONDANSETRON 2 MG/ML
INJECTION INTRAMUSCULAR; INTRAVENOUS AS NEEDED
Status: DISCONTINUED | OUTPATIENT
Start: 2020-11-10 | End: 2020-11-10

## 2020-11-10 RX ADMIN — LIDOCAINE HYDROCHLORIDE 100 MG: 20 INJECTION, SOLUTION EPIDURAL; INFILTRATION; INTRACAUDAL; PERINEURAL at 08:14

## 2020-11-10 RX ADMIN — ROCURONIUM BROMIDE 50 MG: 10 INJECTION, SOLUTION INTRAVENOUS at 08:14

## 2020-11-10 RX ADMIN — NEOSTIGMINE METHYLSULFATE 2 MG: 1 INJECTION, SOLUTION INTRAVENOUS at 09:58

## 2020-11-10 RX ADMIN — DEXAMETHASONE SODIUM PHOSPHATE 4 MG: 4 INJECTION, SOLUTION INTRAMUSCULAR; INTRAVENOUS at 08:30

## 2020-11-10 RX ADMIN — CEFAZOLIN SODIUM 2000 MG: 2 SOLUTION INTRAVENOUS at 07:35

## 2020-11-10 RX ADMIN — GLYCOPYRROLATE 0.4 MG: 0.2 INJECTION, SOLUTION INTRAMUSCULAR; INTRAVENOUS at 09:58

## 2020-11-10 RX ADMIN — FENTANYL CITRATE 50 MCG: 50 INJECTION, SOLUTION INTRAMUSCULAR; INTRAVENOUS at 09:22

## 2020-11-10 RX ADMIN — FENTANYL CITRATE 50 MCG: 50 INJECTION, SOLUTION INTRAMUSCULAR; INTRAVENOUS at 08:53

## 2020-11-10 RX ADMIN — OXYCODONE HYDROCHLORIDE AND ACETAMINOPHEN 1 TABLET: 5; 325 TABLET ORAL at 11:46

## 2020-11-10 RX ADMIN — CEFAZOLIN SODIUM 2000 MG: 2 SOLUTION INTRAVENOUS at 06:49

## 2020-11-10 RX ADMIN — LORAZEPAM 1 MG: 2 INJECTION INTRAMUSCULAR; INTRAVENOUS at 07:08

## 2020-11-10 RX ADMIN — CHLORHEXIDINE GLUCONATE 0.12% ORAL RINSE 15 ML: 1.2 LIQUID ORAL at 06:41

## 2020-11-10 RX ADMIN — FENTANYL CITRATE 100 MCG: 50 INJECTION, SOLUTION INTRAMUSCULAR; INTRAVENOUS at 08:14

## 2020-11-10 RX ADMIN — ONDANSETRON 4 MG: 2 INJECTION INTRAMUSCULAR; INTRAVENOUS at 09:58

## 2020-11-10 RX ADMIN — MIDAZOLAM 2 MG: 1 INJECTION INTRAMUSCULAR; INTRAVENOUS at 08:07

## 2020-11-10 RX ADMIN — PROPOFOL 200 MG: 10 INJECTION, EMULSION INTRAVENOUS at 08:14

## 2020-11-10 RX ADMIN — SODIUM CHLORIDE 125 ML/HR: 0.9 INJECTION, SOLUTION INTRAVENOUS at 06:41

## 2020-11-11 ENCOUNTER — TELEPHONE (OUTPATIENT)
Dept: VASCULAR SURGERY | Facility: CLINIC | Age: 55
End: 2020-11-11

## 2020-11-12 ENCOUNTER — OFFICE VISIT (OUTPATIENT)
Dept: VASCULAR SURGERY | Facility: CLINIC | Age: 55
End: 2020-11-12

## 2020-11-12 VITALS
WEIGHT: 223 LBS | DIASTOLIC BLOOD PRESSURE: 88 MMHG | HEIGHT: 62 IN | TEMPERATURE: 97.1 F | SYSTOLIC BLOOD PRESSURE: 144 MMHG | BODY MASS INDEX: 41.04 KG/M2 | HEART RATE: 90 BPM

## 2020-11-12 DIAGNOSIS — I83.893 SYMPTOMATIC VARICOSE VEINS OF BOTH LOWER EXTREMITIES: Primary | Chronic | ICD-10-CM

## 2020-11-12 PROCEDURE — 3008F BODY MASS INDEX DOCD: CPT | Performed by: SURGERY

## 2020-11-12 PROCEDURE — 99024 POSTOP FOLLOW-UP VISIT: CPT | Performed by: SURGERY

## 2020-12-08 ENCOUNTER — VBI (OUTPATIENT)
Dept: ADMINISTRATIVE | Facility: OTHER | Age: 55
End: 2020-12-08

## 2021-03-03 ENCOUNTER — OFFICE VISIT (OUTPATIENT)
Dept: FAMILY MEDICINE CLINIC | Facility: CLINIC | Age: 56
End: 2021-03-03
Payer: COMMERCIAL

## 2021-03-03 VITALS
RESPIRATION RATE: 96 BRPM | BODY MASS INDEX: 39.79 KG/M2 | WEIGHT: 216.2 LBS | DIASTOLIC BLOOD PRESSURE: 90 MMHG | SYSTOLIC BLOOD PRESSURE: 154 MMHG | TEMPERATURE: 98.6 F | HEART RATE: 91 BPM | HEIGHT: 62 IN

## 2021-03-03 DIAGNOSIS — E78.2 MIXED HYPERLIPIDEMIA: ICD-10-CM

## 2021-03-03 DIAGNOSIS — I10 BENIGN ESSENTIAL HYPERTENSION: Primary | ICD-10-CM

## 2021-03-03 DIAGNOSIS — R06.2 WHEEZING: ICD-10-CM

## 2021-03-03 DIAGNOSIS — R73.9 ELEVATED BLOOD SUGAR: ICD-10-CM

## 2021-03-03 PROCEDURE — 99213 OFFICE O/P EST LOW 20 MIN: CPT | Performed by: FAMILY MEDICINE

## 2021-03-03 PROCEDURE — 3080F DIAST BP >= 90 MM HG: CPT | Performed by: FAMILY MEDICINE

## 2021-03-03 PROCEDURE — 3008F BODY MASS INDEX DOCD: CPT | Performed by: FAMILY MEDICINE

## 2021-03-03 PROCEDURE — 3077F SYST BP >= 140 MM HG: CPT | Performed by: FAMILY MEDICINE

## 2021-03-03 PROCEDURE — 1036F TOBACCO NON-USER: CPT | Performed by: FAMILY MEDICINE

## 2021-03-03 RX ORDER — AMLODIPINE BESYLATE 5 MG/1
5 TABLET ORAL DAILY
Qty: 30 TABLET | Refills: 2 | Status: SHIPPED | OUTPATIENT
Start: 2021-03-03

## 2021-03-03 NOTE — PROGRESS NOTES
Assessment/Plan:    Patient is seen for follow-up of hypertension  It appears she is having side effects from lisinopril and will switch to amlodipine  We will check a chest x-ray because of symptoms of chest tightness/wheezing  She is due for fasting blood work  Will get a chest xray  Switch from Lisinopril   Diagnoses and all orders for this visit:    Benign essential hypertension  -     amLODIPine (NORVASC) 5 mg tablet; Take 1 tablet (5 mg total) by mouth daily    Elevated blood sugar    Mixed hyperlipidemia    Wheezing  -     XR chest pa & lateral; Future          Subjective:   Chief Complaint   Patient presents with    Follow-up     6M        Patient ID: Jerrica Epperson is a 54 y o  female  Patient is here for follow up of chronic medical conditions  Since we increased dose of Lisinopril - she has had a metallic taste and tightness on chest - does not have typical allergy symptoms  The following portions of the patient's history were reviewed and updated as appropriate: allergies, current medications, past family history, past medical history, past social history, past surgical history and problem list     Review of Systems   Constitutional: Negative for chills and fever  HENT: Negative for congestion and sore throat  Respiratory: Positive for chest tightness and wheezing  Cardiovascular: Negative for chest pain and palpitations  Gastrointestinal: Negative for abdominal pain, constipation, diarrhea and nausea  Genitourinary: Negative for difficulty urinating  Skin: Negative  Neurological: Negative for dizziness and headaches  Psychiatric/Behavioral: Negative  Objective:      /90 (BP Location: Left arm, Patient Position: Sitting, Cuff Size: Large)   Pulse 91   Temp 98 6 °F (37 °C) (Tympanic)   Resp (!) 96   Ht 5' 2" (1 575 m)   Wt 98 1 kg (216 lb 3 2 oz)   LMP 08/26/2018   BMI 39 54 kg/m²          Physical Exam  Vitals signs and nursing note reviewed  Constitutional:       General: She is not in acute distress  Neck:      Thyroid: No thyromegaly  Cardiovascular:      Rate and Rhythm: Normal rate and regular rhythm  Heart sounds: Normal heart sounds  Pulmonary:      Effort: Pulmonary effort is normal       Breath sounds: Normal breath sounds  Lymphadenopathy:      Cervical: No cervical adenopathy  Skin:     General: Skin is warm and dry  Neurological:      Mental Status: She is alert and oriented to person, place, and time

## 2021-04-02 ENCOUNTER — RA CDI HCC (OUTPATIENT)
Dept: OTHER | Facility: HOSPITAL | Age: 56
End: 2021-04-02

## 2021-04-02 NOTE — PROGRESS NOTES
[facility]  coding opportunities             Chart reviewed, (number of) suggestions sent to provider: 1     Problem listed updated  Provider Accepted, (number of) suggestions accepted: 1        Patients insurance company: [employer] (Medicare Advantage and Commercial)     Visit status: Patient arrived for their scheduled appointment        [facility]  coding oppertunities       Chart reviewed, (number of) suggestions sent to provider: 1     Problem listed updated   Provider Accepted, (number of) suggestions accepted: 1       [place] [id number]  coding oppertunities      Chart reviewed, (number of) suggestions sent to provider: 1        E66 01: Morbid (severe) obesity due to excess calories ([facility] ) - NOT USED

## 2021-04-08 ENCOUNTER — VBI (OUTPATIENT)
Dept: ADMINISTRATIVE | Facility: OTHER | Age: 56
End: 2021-04-08

## 2021-04-09 ENCOUNTER — APPOINTMENT (OUTPATIENT)
Dept: RADIOLOGY | Facility: CLINIC | Age: 56
End: 2021-04-09
Payer: COMMERCIAL

## 2021-04-09 ENCOUNTER — OFFICE VISIT (OUTPATIENT)
Dept: URGENT CARE | Facility: CLINIC | Age: 56
End: 2021-04-09
Payer: COMMERCIAL

## 2021-04-09 VITALS
HEART RATE: 81 BPM | RESPIRATION RATE: 20 BRPM | DIASTOLIC BLOOD PRESSURE: 86 MMHG | SYSTOLIC BLOOD PRESSURE: 146 MMHG | TEMPERATURE: 96.1 F | OXYGEN SATURATION: 95 %

## 2021-04-09 DIAGNOSIS — J45.909 ASTHMATIC BRONCHITIS WITHOUT COMPLICATION, UNSPECIFIED ASTHMA SEVERITY, UNSPECIFIED WHETHER PERSISTENT: Primary | ICD-10-CM

## 2021-04-09 DIAGNOSIS — R06.2 WHEEZING: ICD-10-CM

## 2021-04-09 PROCEDURE — 99214 OFFICE O/P EST MOD 30 MIN: CPT | Performed by: PHYSICIAN ASSISTANT

## 2021-04-09 PROCEDURE — 71046 X-RAY EXAM CHEST 2 VIEWS: CPT

## 2021-04-09 RX ORDER — PREDNISONE 20 MG/1
TABLET ORAL
Qty: 10 TABLET | Refills: 0 | Status: SHIPPED | OUTPATIENT
Start: 2021-04-09

## 2021-04-09 RX ORDER — ALBUTEROL SULFATE 90 UG/1
AEROSOL, METERED RESPIRATORY (INHALATION)
Qty: 8.5 G | Refills: 0 | Status: SHIPPED | OUTPATIENT
Start: 2021-04-09

## 2021-04-09 NOTE — PATIENT INSTRUCTIONS
Take daily antihistamine such as Allegra or generic Allegra  Use inhaler as instructed  Take prednisone as instructed  While on prednisone do not take any kind of ibuprofen, naproxen, aspirin like products  May safely take Tylenol if needed  Please note that the prednisone may elevate your blood sugar temporarily  Would recommend being off prednisone for 1 week before having your blood test done  Follow-up with your primary care provider in the next 1-2 weeks for re-evaluation  If significant worsening of your symptoms with profound shortness of breath, chest pain, weakness proceed immediately to emergency room for further evaluation

## 2021-04-09 NOTE — PROGRESS NOTES
[facility]    NAME: Jose Nicole is a 54 y o  female  : 1965    MRN: [id number]  DATE: 2021  TIME: 7:21 PM    Assessment and Plan   Asthmatic bronchitis without complication, unspecified asthma severity, unspecified whether persistent [J45 [id number]]  1  Asthmatic bronchitis without complication, unspecified asthma severity, unspecified whether persistent  albuterol (ProAir HFA) 90 mcg/act inhaler    predniSONE 20 mg tablet       Patient Instructions   Patient Instructions   Take daily antihistamine such as Allegra or generic Allegra  Use inhaler as instructed  Take prednisone as instructed  While on prednisone do not take any kind of ibuprofen, naproxen, aspirin like products  May safely take Tylenol if needed  Please note that the prednisone may elevate your blood sugar temporarily  Would recommend being off prednisone for 1 week before having your blood test done  Follow-up with your primary care provider in the next 1-2 weeks for re-evaluation  If significant worsening of your symptoms with profound shortness of breath, chest pain, weakness proceed immediately to emergency room for further evaluation  Chief Complaint     Chief Complaint   Patient presents with    Cold Like Symptoms     Patient with wheezing, congestion and  a lot of mucous since February  Patient took mucinex for a few days last week       History of Present Illness   Jose Nicole presents to the clinic c/o    66-year-old female comes in stating that she has had increased coughing and wheezing over the last 1 5 weeks  She said she has had chest congestion off and on since February with mild cough  She did have COVID test in February that was negative  She saw Dr Wes Sparks in early March for routine checkup  Dr Wes Sparks evidently wanted her to have blood work done as well as a chest x-ray  She has not had those done yet and was planning to come in on Monday for that        She has been taking Mucinex off and on and she used Flonase for a couple days  She says the cough is keeping her up at night and she feels a gurgly sensation in her upper chest   She does have history of seasonal allergies so she has had some head congestion with some postnasal drip and mild itching of the nose  Denies any fever, chills, unusual sweating, unusual fatigue, unusual body aches and pains  Quit smoking about 5 years ago  If she laughs or talks too much that will cause her chest to feel tighter and cause her to cough  Review of Systems   Review of Systems   Constitutional: Negative  HENT: Positive for congestion and postnasal drip  Negative for rhinorrhea  Respiratory: Positive for cough  Negative for apnea, choking, chest tightness, shortness of breath, wheezing and stridor  Cardiovascular: Negative  Neurological: Negative for headaches         Current Medications     Long-Term Medications   Medication Sig Dispense Refill    amLODIPine (NORVASC) 5 mg tablet Take 1 tablet (5 mg total) by mouth daily 30 tablet 2       Current Allergies     Allergies as of 04/09/2021    (No Known Allergies)          The following portions of the patient's history were reviewed and updated as appropriate: allergies, current medications, past family history, past medical history, past social history, past surgical history and problem list   Past Medical History:   Diagnosis Date    Arthritis     Back pain     Claustrophobia     Hypertension     Symptomatic varicose veins of both lower extremities     LAST ASSESSED: [id number]    Urinary frequency     LAST ASSESSED: [id number]    Wears glasses      Past Surgical History:   Procedure Laterality Date    LUMBAR DISCECTOMY      LAST ASSESSED: [id number]    MT PHLEB VEINS - EXTREM 20+ Right 11/10/2020    Procedure: HIGH LIGATION OF ACCESSORY GREATER SAPHENOUS VEIN WITH 22 STAB PHLEBECTOMIES;  Surgeon: Alma Summers MD;  [facility];  Service: Vascular     Family History   Problem Relation Age of Onset    No Known Problems Mother     Hypertension Father        Objective   /86   Pulse 81   Temp (!) 96 1 °F (35 6 °C) (Tympanic)   Resp 20   LMP 08/26/2018   SpO2 95%   Patient's last menstrual period was 08/26/2018  Physical Exam     Physical Exam  Constitutional:       General: She is not in acute distress  Appearance: Normal appearance  She is not ill-appearing, toxic-appearing or diaphoretic  Comments: No conversational dyspnea   HENT:      Head: Normocephalic and atraumatic  Right Ear: Tympanic membrane, ear canal and external ear normal  There is no impacted cerumen  Left Ear: Tympanic membrane, ear canal and external ear normal  There is no impacted cerumen  Nose: Nose normal  No congestion or rhinorrhea  Mouth/Throat:      Mouth: Mucous membranes are moist       Pharynx: No oropharyngeal exudate or posterior oropharyngeal erythema  Comments: Cobblestoning posterior pharynx  Eyes:      General: No scleral icterus  Right eye: No discharge  Left eye: No discharge  Extraocular Movements: Extraocular movements intact  Conjunctiva/sclera: Conjunctivae normal       Pupils: Pupils are equal, round, and reactive to light  Neck:      Musculoskeletal: Normal range of motion and neck supple  No neck rigidity or muscular tenderness  Cardiovascular:      Rate and Rhythm: Normal rate and regular rhythm  Heart sounds: Normal heart sounds  No murmur  No friction rub  No gallop  Pulmonary:      Effort: Pulmonary effort is normal  No respiratory distress  Breath sounds: No stridor  Wheezing present  No rhonchi or rales  Comments: Some and expiratory wheezes throughout  No rales or rhonchi  Some cough with deep breath  Musculoskeletal:      Right lower leg: No edema  Left lower leg: No edema  Lymphadenopathy:      Cervical: No cervical adenopathy     Skin:     General: Skin is warm and dry  Neurological:      Mental Status: She is alert and oriented to person, place, and time     Psychiatric:         Mood and Affect: Mood normal          Behavior: Behavior normal

## 2021-04-15 ENCOUNTER — OFFICE VISIT (OUTPATIENT)
Dept: FAMILY MEDICINE CLINIC | Facility: CLINIC | Age: 56
End: 2021-04-15
Payer: COMMERCIAL

## 2021-04-15 VITALS
OXYGEN SATURATION: 95 % | TEMPERATURE: 98.6 F | BODY MASS INDEX: 40.41 KG/M2 | HEART RATE: 84 BPM | DIASTOLIC BLOOD PRESSURE: 102 MMHG | SYSTOLIC BLOOD PRESSURE: 156 MMHG | HEIGHT: 62 IN | WEIGHT: 219.6 LBS | RESPIRATION RATE: 18 BRPM

## 2021-04-15 DIAGNOSIS — I10 BENIGN ESSENTIAL HYPERTENSION: Primary | ICD-10-CM

## 2021-04-15 DIAGNOSIS — Z12.31 BREAST CANCER SCREENING BY MAMMOGRAM: ICD-10-CM

## 2021-04-15 DIAGNOSIS — Z12.11 ENCOUNTER FOR SCREENING COLONOSCOPY: ICD-10-CM

## 2021-04-15 PROCEDURE — 3725F SCREEN DEPRESSION PERFORMED: CPT | Performed by: FAMILY MEDICINE

## 2021-04-15 PROCEDURE — 99213 OFFICE O/P EST LOW 20 MIN: CPT | Performed by: FAMILY MEDICINE

## 2021-04-15 PROCEDURE — 1036F TOBACCO NON-USER: CPT | Performed by: FAMILY MEDICINE

## 2021-04-15 PROCEDURE — 3008F BODY MASS INDEX DOCD: CPT | Performed by: FAMILY MEDICINE

## 2021-04-15 NOTE — PROGRESS NOTES
Assessment/Plan:  Patient is seen for follow up of blood pressure  Her blood pressure is not well controlled today  She went back on Lisinopril because Amlodipine caused  Headaches  Patient says she rushed to get here because of traffic  Her BP is not well controlled  I asked her to have blood work and return for a recheck of BP  Regarding her respiratory infection, her chest xray was negative and she is not wheezing  If she needs Albuterol, she should use it, but it does cause her heart to beat fast and may then raise her BP  Diagnoses and all orders for this visit:    Benign essential hypertension    Breast cancer screening by mammogram  -     Mammo screening bilateral w 3d & cad; Future    Encounter for screening colonoscopy  -     Ambulatory referral for colonoscopy; Future          Subjective:   Chief Complaint   Patient presents with    Follow-up     1 month check        Patient ID: Myla Schwab is a 54 y o  female  Patient is here for follow up of blood pressure  I had switched her to Amlodipine  Then [facility] was [facility] for bronchitis - taking steroids, Albuterol  Also, she went back on Lisinopril because she got headaches from Amlodipine  Patiet notes that she rushed to get here  She got held up in traffice and was afraid that she would be late  The following portions of the patient's history were reviewed and updated as appropriate: allergies, current medications, past family history, past medical history, past social history, past surgical history and problem list     Review of Systems   Constitutional: Negative for chills and fever  HENT: Negative for congestion and sore throat  Respiratory: Positive for cough  Negative for chest tightness  Cardiovascular: Negative for chest pain and palpitations  Gastrointestinal: Negative for abdominal pain, constipation, diarrhea and nausea  Genitourinary: Negative for difficulty urinating  Skin: Negative  Neurological: Negative for dizziness and headaches  Psychiatric/Behavioral: Negative  Objective:      BP (!) 156/102 (BP Location: Left arm, Patient Position: Sitting)   Pulse 84   Temp 98 6 °F (37 °C) (Tympanic)   Resp 18   Ht 5' 2" (1 575 m)   Wt 99 6 kg (219 lb 9 6 oz)   LMP 08/26/2018   SpO2 95%   BMI 40 17 kg/m²          Physical Exam  Vitals signs and nursing note reviewed  Constitutional:       General: She is not in acute distress  HENT:      Head: Normocephalic  Right Ear: Tympanic membrane normal       Left Ear: Tympanic membrane normal       Mouth/Throat:      Pharynx: No posterior oropharyngeal erythema  Neck:      Thyroid: No thyromegaly  Cardiovascular:      Rate and Rhythm: Normal rate and regular rhythm  Heart sounds: Normal heart sounds  Comments: /96  Pulmonary:      Effort: Pulmonary effort is normal       Breath sounds: Normal breath sounds  No wheezing  Lymphadenopathy:      Cervical: No cervical adenopathy  Skin:     General: Skin is warm and dry  Neurological:      Mental Status: She is alert and oriented to person, place, and time     Psychiatric:         Mood and Affect: Mood normal

## 2021-04-16 ENCOUNTER — TELEPHONE (OUTPATIENT)
Dept: FAMILY MEDICINE CLINIC | Facility: CLINIC | Age: 56
End: 2021-04-16

## 2021-07-01 ENCOUNTER — VBI (OUTPATIENT)
Dept: ADMINISTRATIVE | Facility: OTHER | Age: 56
End: 2021-07-01

## 2021-07-27 ENCOUNTER — TELEPHONE (OUTPATIENT)
Dept: FAMILY MEDICINE CLINIC | Facility: CLINIC | Age: 56
End: 2021-07-27

## 2021-07-28 ENCOUNTER — VBI (OUTPATIENT)
Dept: ADMINISTRATIVE | Facility: OTHER | Age: 56
End: 2021-07-28

## 2022-05-26 ENCOUNTER — TELEPHONE (OUTPATIENT)
Dept: FAMILY MEDICINE CLINIC | Facility: CLINIC | Age: 57
End: 2022-05-26

## 2022-05-26 NOTE — TELEPHONE ENCOUNTER
Please call pt, she hasn't been seen in over 1 year, she can't have been taking her BP medication regularly  Pt needs to schedule a BP check

## 2022-06-20 NOTE — PROGRESS NOTES
Ortho Sports Medicine Knee New Patient Visit     Assesment:   47 y o  female right knee medial joint mild-to-moderate osteoarthritis    Plan:    Discussed options with the patient today  Patient made an informed decision to proceed with physical therapy, topical NSAIDs, and compression (via home brace)  She will follow-up in 3 months, but may cancel if she is feeling better  I suspect there could be a small meniscal tear as well  If still having trouble, may order an MRI at follow-up  Conservative treatment:    Ice to knee for 20 minutes at least 1-2 times daily  PT for ROM/strengthening to knee, hip and core  OTC NSAIDS prn for pain  Tylenol for pain  Imaging: All imaging from today was reviewed by myself and explained to the patient  Injection:    No Injection planned at this time  Surgery:     No surgery is recommended at this point, continue with conservative treatment plan as noted  Follow up:    No follow-ups on file  Chief Complaint   Patient presents with    Right Knee - Pain       History of Present Illness: The patient is a 47 y o  female with a history of HTN, 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, referred to me by their primary care physician, seen in clinic for evaluation of right knee pain  Pain is located anterior, medial   The patient rates the pain as a 8/10  The pain has been present for 1 weeks  The patient sustained no injuries  There is no mechanism of injury  The pain is characterized as sharp, stabbing  The pain is present intermittent  Pain is improved by rest, ice and NSAIDS  Pain is aggravated by stairs, squatting, weight bearing, walking and standing  Symptoms include clicking  The patient has tried rest, ice and NSAIDS            Knee Surgical History:  None    Past Medical, Social and Family History:  Past Medical History:   Diagnosis Date    Symptomatic varicose veins of both lower extremities LAST ASSESSED: [id number]    Urinary frequency     LAST ASSESSED: [id number]     Past Surgical History:   Procedure Laterality Date    LUMBAR DISCECTOMY      LAST ASSESSED: [id number]     No Known Allergies  Current Outpatient Medications on File Prior to Visit   Medication Sig Dispense Refill    lisinopril (ZESTRIL) 20 mg tablet Take 1 tablet (20 mg total) by mouth daily 90 tablet 0     No current facility-administered medications on file prior to visit        Social History     Socioeconomic History    Marital status: /Civil Union     Spouse name: Not on file    Number of children: Not on file    Years of education: Not on file    Highest education level: Not on file   Occupational History    Not on file   Social Needs    Financial resource strain: Not on file    Food insecurity     Worry: Not on file     Inability: Not on file   [employer] needs     Medical: Not on file     Non-medical: Not on file   Tobacco Use    Smoking status: Former Smoker    Smokeless tobacco: Former User    Tobacco comment: TOBACCO USE   Substance and Sexual Activity    Alcohol use: Yes     Comment: rarely    Drug use: No    Sexual activity: Not on file   Lifestyle    Physical activity     Days per week: Not on file     Minutes per session: Not on file    Stress: Not on file   Relationships    Social connections     Talks on phone: Not on file     Gets together: Not on file     Attends Orthodoxy service: Not on file     Active member of club or organization: Not on file     Attends meetings of clubs or organizations: Not on file     Relationship status: Not on file    Intimate partner violence     Fear of current or ex partner: Not on file     Emotionally abused: Not on file     Physically abused: Not on file     Forced sexual activity: Not on file   Other Topics Concern    Not on file   Social History Narrative    Not on file         I have reviewed the past medical, surgical, social and family history, medications and allergies as documented in the EMR  Review of systems: ROS is negative other than that noted in the HPI  Constitutional: Negative for fatigue and fever  HENT: Negative for sore throat  Respiratory: Negative for shortness of breath  Cardiovascular: Negative for chest pain  Gastrointestinal: Negative for abdominal pain  Endocrine: Negative for cold intolerance and heat intolerance  Genitourinary: Negative for flank pain  Musculoskeletal: Negative for back pain  Skin: Negative for rash  Allergic/Immunologic: Negative for immunocompromised state  Neurological: Negative for dizziness  Psychiatric/Behavioral: Negative for agitation  Physical Exam:    Last menstrual period 08/26/2018  General/Constitutional: NAD, well developed, well nourished  HENT: Normocephalic, atraumatic  CV: Intact distal pulses, regular rate  Resp: No respiratory distress or labored breathing  Lymphatic: No lymphadenopathy palpated  Neuro: Alert and Oriented x 3, no focal deficits  Psych: Normal mood, normal affect, normal judgement, normal behavior  Skin: Warm, dry, no rashes, no erythema      Knee Exam (focused): RIGHT LEFT   ROM:   0-130 0-130   Palpation: Effusion negative negative     MJL tenderness Positive Negative     LJL tenderness Negative Negative   Meniscus: William Negative Negative    Apley's Compression Negative Negative   Instability: Varus stable stable     Valgus stable stable   Special Tests: Lachman Negative Negative     Posterior drawer Negative Negative     Anterior drawer Negative Negative     Pivot shift not tested not tested     Dial not tested not tested   Patella: Palpation no tenderness no tenderness     Mobility 1/4 1/4     Apprehension Negative Negative   Other: Single leg 1/4 squat not tested not tested      Tenderness with valgus stress of the left knee      LE NV Exam: +2 DP/PT pulses bilaterally  Sensation intact to light touch L2-S1 bilaterally Bilateral hip ROM demonstrates no pain actively or passively    No calf tenderness to palpation bilaterally    Knee Imaging    X-rays of the right knee were reviewed, which demonstrate mild-to-moderate medial joint OA  Left greater than right  I have reviewed the radiology report and agree with their impression        Scribe Attestation    I,:    am acting as a scribe while in the presence of the attending physician :        I,:    personally performed the services described in this documentation    as scribed in my presence : Respiratory

## 2022-07-01 NOTE — TELEPHONE ENCOUNTER
Patient returned call regarding scheduling an appt    She said she moved to [place] and is no longer a patient of [address]

## 2022-07-12 NOTE — TELEPHONE ENCOUNTER
07/12/22 9:15 AM     Thank you for your request  Your request has been received, reviewed, and the patient chart updated  The PCP has successfully been removed with a patient attribution note  This message will now be completed      Thank you  Cynthia Dugan

## (undated) DEVICE — DRAPE SHEET THREE QUARTER

## (undated) DEVICE — KERLIX BANDAGE ROLL: Brand: KERLIX

## (undated) DEVICE — ADHESIVE SKIN HIGH VISCOSITY EXOFIN 1ML

## (undated) DEVICE — COBAN 6 IN STERILE

## (undated) DEVICE — STANDARD SURGICAL GOWN, L: Brand: CONVERTORS

## (undated) DEVICE — SUT MONOCRYL 4-0 PS-2 27 IN Y426H

## (undated) DEVICE — TIBURON SPLIT SHEET: Brand: CONVERTORS

## (undated) DEVICE — LIGACLIP MCA MULTIPLE CLIP APPLIERS, 20 MEDIUM CLIPS: Brand: LIGACLIP

## (undated) DEVICE — GLOVE SRG BIOGEL 8.5

## (undated) DEVICE — INTENDED FOR TISSUE SEPARATION, AND OTHER PROCEDURES THAT REQUIRE A SHARP SURGICAL BLADE TO PUNCTURE OR CUT.: Brand: BARD-PARKER SAFETY BLADES SIZE 15, STERILE

## (undated) DEVICE — CHLORAPREP HI-LITE 26ML ORANGE

## (undated) DEVICE — SUT MONOCRYL 3-0 SH 27 IN Y416H

## (undated) DEVICE — BETHLEHEM UNIVERSAL MINOR GEN: Brand: CARDINAL HEALTH

## (undated) DEVICE — PLUMEPEN PRO 10FT

## (undated) DEVICE — GAUZE SPONGES,16 PLY: Brand: CURITY

## (undated) DEVICE — ACE WRAP 4 IN STERILE

## (undated) DEVICE — GLOVE SRG BIOGEL ORTHOPEDIC 7.5

## (undated) DEVICE — 3M™ STERI-STRIP™ REINFORCED ADHESIVE SKIN CLOSURES, R1547, 1/2 IN X 4 IN (12 MM X 100 MM), 6 STRIPS/ENVELOPE: Brand: 3M™ STERI-STRIP™

## (undated) DEVICE — SUT SILK 4-0 30 IN A303H

## (undated) DEVICE — PROXIMATE SKIN STAPLERS (35 WIDE) CONTAINS 35 STAINLESS STEEL STAPLES (FIXED HEAD): Brand: PROXIMATE

## (undated) DEVICE — GLOVE INDICATOR PI UNDERGLOVE SZ 7.5 BLUE

## (undated) DEVICE — ACE WRAP 6 IN STERILE

## (undated) DEVICE — SUT SILK 3-0 30 IN A304H

## (undated) DEVICE — TUBING SUCTION 5MM X 12 FT

## (undated) DEVICE — DRAPE SHEET X-LG

## (undated) DEVICE — GLOVE PI ULTRA TOUCH SZ.7.5

## (undated) DEVICE — LIGACLIP MCA MULTIPLE CLIP APPLIERS, 20 SMALL CLIPS: Brand: LIGACLIP

## (undated) DEVICE — 10FR FRAZIER SUCTION HANDLE: Brand: CARDINAL HEALTH

## (undated) DEVICE — 3M™ STERI-STRIP™ REINFORCED ADHESIVE SKIN CLOSURES, R1546, 1/4 IN X 4 IN (6 MM X 100 MM), 10 STRIPS/ENVELOPE: Brand: 3M™ STERI-STRIP™

## (undated) DEVICE — COBAN 4 IN STERILE